# Patient Record
Sex: MALE | Race: WHITE | NOT HISPANIC OR LATINO | Employment: OTHER | URBAN - METROPOLITAN AREA
[De-identification: names, ages, dates, MRNs, and addresses within clinical notes are randomized per-mention and may not be internally consistent; named-entity substitution may affect disease eponyms.]

---

## 2019-03-27 ENCOUNTER — HISTORICAL (OUTPATIENT)
Dept: LAB | Facility: HOSPITAL | Age: 79
End: 2019-03-27

## 2019-03-27 LAB
ABS NEUT (OLG): 5.18 X10(3)/MCL (ref 2.1–9.2)
ALBUMIN SERPL-MCNC: 3.8 GM/DL (ref 3.4–5)
ALBUMIN/GLOB SERPL: 1.4 RATIO (ref 1.1–2)
ALP SERPL-CCNC: 58 UNIT/L (ref 50–136)
ALT SERPL-CCNC: 22 UNIT/L (ref 12–78)
AST SERPL-CCNC: 14 UNIT/L (ref 15–37)
BASOPHILS # BLD AUTO: 0 X10(3)/MCL (ref 0–0.2)
BASOPHILS NFR BLD AUTO: 0 %
BILIRUB SERPL-MCNC: 0.6 MG/DL (ref 0.2–1)
BILIRUBIN DIRECT+TOT PNL SERPL-MCNC: 0.1 MG/DL (ref 0–0.5)
BILIRUBIN DIRECT+TOT PNL SERPL-MCNC: 0.5 MG/DL (ref 0–0.8)
BUN SERPL-MCNC: 22 MG/DL (ref 7–18)
CALCIUM SERPL-MCNC: 9.1 MG/DL (ref 8.5–10.1)
CHLORIDE SERPL-SCNC: 106 MMOL/L (ref 98–107)
CHOLEST SERPL-MCNC: 215 MG/DL (ref 0–200)
CHOLEST/HDLC SERPL: 4.8 {RATIO} (ref 0–5)
CO2 SERPL-SCNC: 29 MMOL/L (ref 21–32)
CREAT SERPL-MCNC: 0.86 MG/DL (ref 0.7–1.3)
EOSINOPHIL # BLD AUTO: 0.2 X10(3)/MCL (ref 0–0.9)
EOSINOPHIL NFR BLD AUTO: 3 %
ERYTHROCYTE [DISTWIDTH] IN BLOOD BY AUTOMATED COUNT: 12.5 % (ref 11.5–17)
GLOBULIN SER-MCNC: 2.7 GM/DL (ref 2.4–3.5)
GLUCOSE SERPL-MCNC: 100 MG/DL (ref 74–106)
HCT VFR BLD AUTO: 46.4 % (ref 42–52)
HDLC SERPL-MCNC: 45 MG/DL (ref 35–60)
HGB BLD-MCNC: 14.9 GM/DL (ref 14–18)
LDLC SERPL CALC-MCNC: 145 MG/DL (ref 0–129)
LYMPHOCYTES # BLD AUTO: 1.4 X10(3)/MCL (ref 0.6–4.6)
LYMPHOCYTES NFR BLD AUTO: 18 %
MCH RBC QN AUTO: 29.5 PG (ref 27–31)
MCHC RBC AUTO-ENTMCNC: 32.1 GM/DL (ref 33–36)
MCV RBC AUTO: 91.9 FL (ref 80–94)
MONOCYTES # BLD AUTO: 0.6 X10(3)/MCL (ref 0.1–1.3)
MONOCYTES NFR BLD AUTO: 8 %
NEUTROPHILS # BLD AUTO: 5.18 X10(3)/MCL (ref 2.1–9.2)
NEUTROPHILS NFR BLD AUTO: 70 %
PLATELET # BLD AUTO: 249 X10(3)/MCL (ref 130–400)
PMV BLD AUTO: 10.6 FL (ref 9.4–12.4)
POTASSIUM SERPL-SCNC: 4 MMOL/L (ref 3.5–5.1)
PROT SERPL-MCNC: 6.5 GM/DL (ref 6.4–8.2)
PSA SERPL-MCNC: 3.37 NG/ML (ref 0–4)
RBC # BLD AUTO: 5.05 X10(6)/MCL (ref 4.7–6.1)
SODIUM SERPL-SCNC: 141 MMOL/L (ref 136–145)
T3FREE SERPL-MCNC: 2.89 PG/ML (ref 2.18–3.98)
T4 FREE SERPL-MCNC: 1.07 NG/DL (ref 0.76–1.46)
TRIGL SERPL-MCNC: 127 MG/DL (ref 30–150)
TSH SERPL-ACNC: 2.04 MIU/L (ref 0.36–3.74)
VLDLC SERPL CALC-MCNC: 25 MG/DL
WBC # SPEC AUTO: 7.4 X10(3)/MCL (ref 4.5–11.5)

## 2019-09-12 ENCOUNTER — HISTORICAL (OUTPATIENT)
Dept: RADIOLOGY | Facility: HOSPITAL | Age: 79
End: 2019-09-12

## 2020-08-12 ENCOUNTER — HISTORICAL (OUTPATIENT)
Dept: LAB | Facility: HOSPITAL | Age: 80
End: 2020-08-12

## 2020-08-12 LAB
ABS NEUT (OLG): 4.69 X10(3)/MCL (ref 2.1–9.2)
ALBUMIN SERPL-MCNC: 3.7 GM/DL (ref 3.4–4.8)
ALBUMIN/GLOB SERPL: 1.3 RATIO (ref 1.1–2)
ALP SERPL-CCNC: 54 UNIT/L (ref 40–150)
ALT SERPL-CCNC: 23 UNIT/L (ref 0–55)
AST SERPL-CCNC: 22 UNIT/L (ref 5–34)
BASOPHILS # BLD AUTO: 0.06 X10(3)/MCL (ref 0–0.2)
BASOPHILS NFR BLD AUTO: 0.9 % (ref 0–0.9)
BILIRUB SERPL-MCNC: 0.4 MG/DL (ref 0.2–1.2)
BILIRUBIN DIRECT+TOT PNL SERPL-MCNC: 0.2 MG/DL (ref 0–0.5)
BILIRUBIN DIRECT+TOT PNL SERPL-MCNC: 0.2 MG/DL (ref 0–0.8)
BUN SERPL-MCNC: 27 MG/DL (ref 8.4–25.7)
CALCIUM SERPL-MCNC: 9.6 MG/DL (ref 8.8–10)
CHLORIDE SERPL-SCNC: 100 MMOL/L (ref 98–107)
CHOLEST SERPL-MCNC: 197 MG/DL
CHOLEST/HDLC SERPL: 5 {RATIO} (ref 0–5)
CO2 SERPL-SCNC: 26 MMOL/L (ref 23–31)
CREAT SERPL-MCNC: 1.02 MG/DL (ref 0.72–1.25)
EOSINOPHIL # BLD AUTO: 0.29 X10(3)/MCL (ref 0–0.9)
EOSINOPHIL NFR BLD AUTO: 4.3 % (ref 0–6.5)
ERYTHROCYTE [DISTWIDTH] IN BLOOD BY AUTOMATED COUNT: 12 % (ref 11.5–17)
GLOBULIN SER-MCNC: 2.9 GM/DL (ref 2.4–3.5)
GLUCOSE SERPL-MCNC: 106 MG/DL (ref 82–115)
HCT VFR BLD AUTO: 38.7 % (ref 42–52)
HDLC SERPL-MCNC: 39 MG/DL (ref 40–60)
HGB BLD-MCNC: 13.7 GM/DL (ref 14–18)
IMM GRANULOCYTES # BLD AUTO: 0.02 10*3/UL (ref 0–0.02)
IMM GRANULOCYTES NFR BLD AUTO: 0.3 % (ref 0–0.43)
LDLC SERPL CALC-MCNC: 140 MG/DL (ref 50–140)
LYMPHOCYTES # BLD AUTO: 1.12 X10(3)/MCL (ref 0.6–4.6)
LYMPHOCYTES NFR BLD AUTO: 16.6 % (ref 16.2–38.3)
MCH RBC QN AUTO: 30.6 PG (ref 27–31)
MCHC RBC AUTO-ENTMCNC: 35.4 GM/DL (ref 33–36)
MCV RBC AUTO: 86.6 FL (ref 80–94)
MONOCYTES # BLD AUTO: 0.58 X10(3)/MCL (ref 0.1–1.3)
MONOCYTES NFR BLD AUTO: 8.6 % (ref 4.7–11.3)
NEUTROPHILS # BLD AUTO: 4.69 X10(3)/MCL (ref 2.1–9.2)
NEUTROPHILS NFR BLD AUTO: 69.3 % (ref 49.1–73.4)
NRBC BLD AUTO-RTO: 0 % (ref 0–0.2)
PLATELET # BLD AUTO: 242 X10(3)/MCL (ref 130–400)
PMV BLD AUTO: 9.3 FL (ref 7.4–10.4)
POTASSIUM SERPL-SCNC: 4.1 MMOL/L (ref 3.5–5.1)
PROT SERPL-MCNC: 6.6 GM/DL (ref 5.8–7.6)
PSA SERPL-MCNC: 2.53 NG/ML
RBC # BLD AUTO: 4.47 X10(6)/MCL (ref 4.7–6.1)
SODIUM SERPL-SCNC: 135 MMOL/L (ref 136–145)
TRIGL SERPL-MCNC: 90 MG/DL (ref 0–150)
TSH SERPL-ACNC: 2.75 UIU/ML (ref 0.35–4.94)
VLDLC SERPL CALC-MCNC: 18 MG/DL
WBC # SPEC AUTO: 6.8 X10(3)/MCL (ref 4.5–11.5)

## 2021-08-09 ENCOUNTER — HISTORICAL (OUTPATIENT)
Dept: LAB | Facility: HOSPITAL | Age: 81
End: 2021-08-09

## 2021-08-09 LAB
ABS NEUT (OLG): 4.82 X10(3)/MCL (ref 2.1–9.2)
ALBUMIN SERPL-MCNC: 3.8 GM/DL (ref 3.4–4.8)
ALBUMIN/GLOB SERPL: 1.1 RATIO (ref 1.1–2)
ALP SERPL-CCNC: 59 UNIT/L (ref 40–150)
ALT SERPL-CCNC: 15 UNIT/L (ref 0–55)
APPEARANCE, UA: CLEAR
AST SERPL-CCNC: 16 UNIT/L (ref 5–34)
BACTERIA SPEC CULT: NORMAL
BASOPHILS # BLD AUTO: 0.03 X10(3)/MCL (ref 0–0.2)
BASOPHILS NFR BLD AUTO: 0.4 % (ref 0–0.9)
BILIRUB SERPL-MCNC: 0.6 MG/DL (ref 0.2–1.2)
BILIRUB UR QL STRIP: NEGATIVE
BILIRUBIN DIRECT+TOT PNL SERPL-MCNC: 0.3 MG/DL (ref 0–0.5)
BILIRUBIN DIRECT+TOT PNL SERPL-MCNC: 0.3 MG/DL (ref 0–0.8)
BUN SERPL-MCNC: 29.6 MG/DL (ref 8.4–25.7)
CALCIUM SERPL-MCNC: 9.6 MG/DL (ref 8.8–10)
CHLORIDE SERPL-SCNC: 99 MMOL/L (ref 98–107)
CHOLEST SERPL-MCNC: 157 MG/DL
CHOLEST/HDLC SERPL: 4 {RATIO} (ref 0–5)
CO2 SERPL-SCNC: 25 MMOL/L (ref 23–31)
COLOR UR: YELLOW
CREAT SERPL-MCNC: 1.13 MG/DL (ref 0.72–1.25)
EOSINOPHIL # BLD AUTO: 0.25 X10(3)/MCL (ref 0–0.9)
EOSINOPHIL NFR BLD AUTO: 3.7 % (ref 0–6.5)
ERYTHROCYTE [DISTWIDTH] IN BLOOD BY AUTOMATED COUNT: 12 % (ref 11.5–17)
EST. AVERAGE GLUCOSE BLD GHB EST-MCNC: 102.5 MG/DL
GLOBULIN SER-MCNC: 3.4 GM/DL (ref 2.4–3.5)
GLUCOSE (UA): NEGATIVE
GLUCOSE SERPL-MCNC: 107 MG/DL (ref 82–115)
HBA1C MFR BLD: 5.2 %
HCT VFR BLD AUTO: 38.1 % (ref 42–52)
HDLC SERPL-MCNC: 43 MG/DL (ref 40–60)
HGB BLD-MCNC: 13 GM/DL (ref 14–18)
HGB UR QL STRIP: ABNORMAL
IMM GRANULOCYTES # BLD AUTO: 0.02 10*3/UL (ref 0–0.02)
IMM GRANULOCYTES NFR BLD AUTO: 0.3 % (ref 0–0.43)
KETONES UR QL STRIP: NEGATIVE
LDLC SERPL CALC-MCNC: 103 MG/DL (ref 50–140)
LEUKOCYTE ESTERASE UR QL STRIP: NEGATIVE
LYMPHOCYTES # BLD AUTO: 1.13 X10(3)/MCL (ref 0.6–4.6)
LYMPHOCYTES NFR BLD AUTO: 16.8 % (ref 16.2–38.3)
MCH RBC QN AUTO: 29.4 PG (ref 27–31)
MCHC RBC AUTO-ENTMCNC: 34.1 GM/DL (ref 33–36)
MCV RBC AUTO: 86.2 FL (ref 80–94)
MONOCYTES # BLD AUTO: 0.48 X10(3)/MCL (ref 0.1–1.3)
MONOCYTES NFR BLD AUTO: 7.1 % (ref 4.7–11.3)
NEUTROPHILS # BLD AUTO: 4.82 X10(3)/MCL (ref 2.1–9.2)
NEUTROPHILS NFR BLD AUTO: 71.7 % (ref 49.1–73.4)
NITRITE UR QL STRIP: NEGATIVE
NRBC BLD AUTO-RTO: 0 % (ref 0–0.2)
PH UR STRIP: 5.5 [PH] (ref 5–7)
PLATELET # BLD AUTO: 402 X10(3)/MCL (ref 130–400)
PMV BLD AUTO: 8.5 FL (ref 7.4–10.4)
POTASSIUM SERPL-SCNC: 4 MMOL/L (ref 3.5–5.1)
PROT SERPL-MCNC: 7.2 GM/DL (ref 5.8–7.6)
PROT UR QL STRIP: NEGATIVE
PSA SERPL-MCNC: 5.08 NG/ML
RBC # BLD AUTO: 4.42 X10(6)/MCL (ref 4.7–6.1)
RBC #/AREA URNS HPF: 0 /[HPF]
SODIUM SERPL-SCNC: 133 MMOL/L (ref 136–145)
SP GR UR STRIP: 1.02 (ref 1–1.03)
SQUAMOUS EPITHELIAL, UA: NORMAL /LPF
TRIGL SERPL-MCNC: 57 MG/DL (ref 0–150)
TSH SERPL-ACNC: 2.8 UIU/ML (ref 0.35–4.94)
UROBILINOGEN UR STRIP-ACNC: NEGATIVE
VLDLC SERPL CALC-MCNC: 11 MG/DL
WBC # SPEC AUTO: 6.7 X10(3)/MCL (ref 4.5–11.5)
WBC #/AREA URNS HPF: 0 /[HPF]

## 2021-08-10 ENCOUNTER — HISTORICAL (OUTPATIENT)
Dept: LAB | Facility: HOSPITAL | Age: 81
End: 2021-08-10

## 2021-08-10 LAB
FERRITIN SERPL-MCNC: 370.28 NG/ML (ref 21.81–274.66)
FOLATE SERPL-MCNC: 19.1 NG/ML (ref 7–31.4)
IRON SATN MFR SERPL: 29 % (ref 20–50)
IRON SERPL-MCNC: 76 UG/DL (ref 65–175)
TIBC SERPL-MCNC: 184 UG/DL (ref 69–240)
TIBC SERPL-MCNC: 260 UG/DL (ref 250–450)
TRANSFERRIN SERPL-MCNC: 239 MG/DL
VIT B12 SERPL-MCNC: 605 PG/ML (ref 213–816)

## 2022-04-10 ENCOUNTER — HISTORICAL (OUTPATIENT)
Dept: ADMINISTRATIVE | Facility: HOSPITAL | Age: 82
End: 2022-04-10

## 2022-04-29 VITALS
WEIGHT: 202.38 LBS | DIASTOLIC BLOOD PRESSURE: 73 MMHG | BODY MASS INDEX: 28.97 KG/M2 | OXYGEN SATURATION: 96 % | HEIGHT: 70 IN | SYSTOLIC BLOOD PRESSURE: 132 MMHG

## 2022-08-12 ENCOUNTER — TELEPHONE (OUTPATIENT)
Dept: FAMILY MEDICINE | Facility: CLINIC | Age: 82
End: 2022-08-12
Payer: MEDICARE

## 2022-08-12 NOTE — TELEPHONE ENCOUNTER
----- Message from Giovana Elsy sent at 8/12/2022 11:46 AM CDT -----  Regarding: med refill  Type:  Needs Medical Advice    Who Called: pt   Symptoms (please be specific):    How long has patient had these symptoms:    Pharmacy name and phone #:    Would the patient rather a call back or a response via [facility]? Call back   Best Call Back Number: [phone]  Additional Information: pt is out of medication and would like a refill. Pls f/u with patient because there are no meds listed in his chart and he was very difficult to understand on the phone.

## 2022-08-15 ENCOUNTER — TELEPHONE (OUTPATIENT)
Dept: FAMILY MEDICINE | Facility: CLINIC | Age: 82
End: 2022-08-15
Payer: MEDICARE

## 2022-08-15 DIAGNOSIS — I10 HYPERTENSION, UNSPECIFIED TYPE: Primary | ICD-10-CM

## 2022-08-15 RX ORDER — HYDROCHLOROTHIAZIDE 25 MG/1
1 TABLET ORAL DAILY
COMMUNITY
Start: 2021-08-16 | End: 2022-08-15 | Stop reason: SDUPTHER

## 2022-08-15 RX ORDER — HYDROCHLOROTHIAZIDE 25 MG/1
25 TABLET ORAL DAILY
Qty: 90 TABLET | Refills: 0 | Status: SHIPPED | OUTPATIENT
Start: 2022-08-15 | End: 2022-11-04

## 2022-08-15 NOTE — TELEPHONE ENCOUNTER
LOV:  NOV: 8.30.22  I spoke with patient and he needs hctz rx sent to pharmacy. Rx sent and patient aware. Maria Del Carmen

## 2022-08-31 ENCOUNTER — LAB VISIT (OUTPATIENT)
Dept: LAB | Facility: HOSPITAL | Age: 82
End: 2022-08-31
Attending: NURSE PRACTITIONER
Payer: MEDICARE

## 2022-08-31 ENCOUNTER — OFFICE VISIT (OUTPATIENT)
Dept: FAMILY MEDICINE | Facility: CLINIC | Age: 82
End: 2022-08-31
Payer: MEDICARE

## 2022-08-31 VITALS
TEMPERATURE: 99 F | OXYGEN SATURATION: 96 % | SYSTOLIC BLOOD PRESSURE: 125 MMHG | WEIGHT: 200.38 LBS | HEIGHT: 69 IN | HEART RATE: 69 BPM | BODY MASS INDEX: 29.68 KG/M2 | DIASTOLIC BLOOD PRESSURE: 68 MMHG | RESPIRATION RATE: 16 BRPM

## 2022-08-31 DIAGNOSIS — G89.29 CHRONIC PAIN OF BOTH KNEES: ICD-10-CM

## 2022-08-31 DIAGNOSIS — E03.9 HYPOTHYROIDISM, UNSPECIFIED TYPE: ICD-10-CM

## 2022-08-31 DIAGNOSIS — E78.5 HYPERLIPIDEMIA, UNSPECIFIED HYPERLIPIDEMIA TYPE: ICD-10-CM

## 2022-08-31 DIAGNOSIS — I10 PRIMARY HYPERTENSION: ICD-10-CM

## 2022-08-31 DIAGNOSIS — M25.561 CHRONIC PAIN OF BOTH KNEES: ICD-10-CM

## 2022-08-31 DIAGNOSIS — M25.551 RIGHT HIP PAIN: ICD-10-CM

## 2022-08-31 DIAGNOSIS — Z87.898 HISTORY OF ELEVATED PSA: ICD-10-CM

## 2022-08-31 DIAGNOSIS — Z00.00 WELLNESS EXAMINATION: Primary | ICD-10-CM

## 2022-08-31 DIAGNOSIS — Z12.5 ENCOUNTER FOR PROSTATE CANCER SCREENING: ICD-10-CM

## 2022-08-31 DIAGNOSIS — M25.562 CHRONIC PAIN OF BOTH KNEES: ICD-10-CM

## 2022-08-31 DIAGNOSIS — Z23 NEED FOR VACCINATION AGAINST STREPTOCOCCUS PNEUMONIAE: ICD-10-CM

## 2022-08-31 DIAGNOSIS — Z74.09 MOBILITY IMPAIRED: ICD-10-CM

## 2022-08-31 DIAGNOSIS — R01.1 MURMUR, CARDIAC: ICD-10-CM

## 2022-08-31 DIAGNOSIS — Z00.00 WELLNESS EXAMINATION: ICD-10-CM

## 2022-08-31 DIAGNOSIS — G47.33 OSA ON CPAP: ICD-10-CM

## 2022-08-31 LAB
ALBUMIN SERPL-MCNC: 4.1 GM/DL (ref 3.4–4.8)
ALBUMIN/GLOB SERPL: 1.5 RATIO (ref 1.1–2)
ALP SERPL-CCNC: 51 UNIT/L (ref 40–150)
ALT SERPL-CCNC: 13 UNIT/L (ref 0–55)
AST SERPL-CCNC: 16 UNIT/L (ref 5–34)
BASOPHILS # BLD AUTO: 0.05 X10(3)/MCL (ref 0–0.2)
BASOPHILS NFR BLD AUTO: 0.8 %
BILIRUBIN DIRECT+TOT PNL SERPL-MCNC: 0.6 MG/DL
BUN SERPL-MCNC: 30.6 MG/DL (ref 8.4–25.7)
CALCIUM SERPL-MCNC: 10 MG/DL (ref 8.8–10)
CHLORIDE SERPL-SCNC: 102 MMOL/L (ref 98–107)
CHOLEST SERPL-MCNC: 174 MG/DL
CHOLEST/HDLC SERPL: 4 {RATIO} (ref 0–5)
CO2 SERPL-SCNC: 27 MMOL/L (ref 23–31)
CREAT SERPL-MCNC: 1.03 MG/DL (ref 0.73–1.18)
EOSINOPHIL # BLD AUTO: 0.19 X10(3)/MCL (ref 0–0.9)
EOSINOPHIL NFR BLD AUTO: 3.1 %
ERYTHROCYTE [DISTWIDTH] IN BLOOD BY AUTOMATED COUNT: 11.9 % (ref 11.5–17)
GFR SERPLBLD CREATININE-BSD FMLA CKD-EPI: >60 MLS/MIN/1.73/M2
GLOBULIN SER-MCNC: 2.8 GM/DL (ref 2.4–3.5)
GLUCOSE SERPL-MCNC: 99 MG/DL (ref 82–115)
HCT VFR BLD AUTO: 37.2 % (ref 42–52)
HDLC SERPL-MCNC: 39 MG/DL (ref 35–60)
HGB BLD-MCNC: 13 GM/DL (ref 14–18)
IMM GRANULOCYTES # BLD AUTO: 0.01 X10(3)/MCL (ref 0–0.04)
IMM GRANULOCYTES NFR BLD AUTO: 0.2 %
LDLC SERPL CALC-MCNC: 118 MG/DL (ref 50–140)
LYMPHOCYTES # BLD AUTO: 1.07 X10(3)/MCL (ref 0.6–4.6)
LYMPHOCYTES NFR BLD AUTO: 17.7 %
MCH RBC QN AUTO: 30.8 PG (ref 27–31)
MCHC RBC AUTO-ENTMCNC: 34.9 MG/DL (ref 33–36)
MCV RBC AUTO: 88.2 FL (ref 80–94)
MONOCYTES # BLD AUTO: 0.47 X10(3)/MCL (ref 0.1–1.3)
MONOCYTES NFR BLD AUTO: 7.8 %
NEUTROPHILS # BLD AUTO: 4.3 X10(3)/MCL (ref 2.1–9.2)
NEUTROPHILS NFR BLD AUTO: 70.4 %
NRBC BLD AUTO-RTO: 0 %
PLATELET # BLD AUTO: 232 X10(3)/MCL (ref 130–400)
PMV BLD AUTO: 9 FL (ref 7.4–10.4)
POTASSIUM SERPL-SCNC: 3.9 MMOL/L (ref 3.5–5.1)
PROT SERPL-MCNC: 6.9 GM/DL (ref 5.8–7.6)
PSA SERPL-MCNC: 4.49 NG/ML
RBC # BLD AUTO: 4.22 X10(6)/MCL (ref 4.7–6.1)
SODIUM SERPL-SCNC: 141 MMOL/L (ref 136–145)
TRIGL SERPL-MCNC: 86 MG/DL (ref 34–140)
TSH SERPL-ACNC: 2.44 UIU/ML (ref 0.35–4.94)
VLDLC SERPL CALC-MCNC: 17 MG/DL
WBC # SPEC AUTO: 6 X10(3)/MCL (ref 4.5–11.5)

## 2022-08-31 PROCEDURE — 80053 COMPREHEN METABOLIC PANEL: CPT

## 2022-08-31 PROCEDURE — G0438 PR WELCOME MEDICARE ANNUAL WELLNESS INITIAL VISIT: ICD-10-PCS | Mod: ,,, | Performed by: NURSE PRACTITIONER

## 2022-08-31 PROCEDURE — G0009 PNEUMOCOCCAL CONJUGATE VACCINE 13-VALENT LESS THAN 5YO & GREATER THAN: ICD-10-PCS | Mod: ,,, | Performed by: NURSE PRACTITIONER

## 2022-08-31 PROCEDURE — 90670 PCV13 VACCINE IM: CPT | Mod: ,,, | Performed by: NURSE PRACTITIONER

## 2022-08-31 PROCEDURE — G0009 ADMIN PNEUMOCOCCAL VACCINE: HCPCS | Mod: ,,, | Performed by: NURSE PRACTITIONER

## 2022-08-31 PROCEDURE — 36415 COLL VENOUS BLD VENIPUNCTURE: CPT

## 2022-08-31 PROCEDURE — 84153 ASSAY OF PSA TOTAL: CPT

## 2022-08-31 PROCEDURE — 84443 ASSAY THYROID STIM HORMONE: CPT

## 2022-08-31 PROCEDURE — 85025 COMPLETE CBC W/AUTO DIFF WBC: CPT

## 2022-08-31 PROCEDURE — 90670 PNEUMOCOCCAL CONJUGATE VACCINE 13-VALENT LESS THAN 5YO & GREATER THAN: ICD-10-PCS | Mod: ,,, | Performed by: NURSE PRACTITIONER

## 2022-08-31 PROCEDURE — 80061 LIPID PANEL: CPT

## 2022-08-31 PROCEDURE — G0438 PPPS, INITIAL VISIT: HCPCS | Mod: ,,, | Performed by: NURSE PRACTITIONER

## 2022-08-31 RX ORDER — AMLODIPINE BESYLATE 5 MG/1
5 TABLET ORAL DAILY
COMMUNITY
Start: 2022-07-26 | End: 2022-12-15 | Stop reason: SDUPTHER

## 2022-08-31 RX ORDER — LISINOPRIL 20 MG/1
20 TABLET ORAL 2 TIMES DAILY
COMMUNITY
Start: 2022-06-11 | End: 2023-01-30 | Stop reason: SDUPTHER

## 2022-08-31 RX ORDER — AMOXICILLIN 500 MG/1
500 CAPSULE ORAL EVERY 6 HOURS
COMMUNITY
Start: 2022-03-28 | End: 2023-01-03

## 2022-08-31 RX ORDER — FENOFIBRATE 48 MG/1
48 TABLET, FILM COATED ORAL DAILY
COMMUNITY
Start: 2022-08-21 | End: 2022-11-28 | Stop reason: SDUPTHER

## 2022-08-31 RX ORDER — TAMSULOSIN HYDROCHLORIDE 0.4 MG/1
0.4 CAPSULE ORAL DAILY
COMMUNITY
Start: 2022-07-26

## 2022-08-31 RX ORDER — LEVOTHYROXINE SODIUM 50 UG/1
50 TABLET ORAL DAILY
COMMUNITY
Start: 2022-07-26 | End: 2022-12-15 | Stop reason: SDUPTHER

## 2022-08-31 NOTE — PROGRESS NOTES
Subjective:      Patient ID: Eloy Patel is a 82 y.o. White male      Chief Complaint: Medicare AWV (wellness)       Past Medical History:   Diagnosis Date    Anemia, unspecified     Elevated PSA     HLD (hyperlipidemia)     HTN (hypertension)     Hypothyroidism     Mobility impaired     Obesity     WOLF on CPAP         HPI  Presents to clinic for Annual Wellness.  Speicalists are Dr. Canseco, Dermatology    Labs due and ordered  Colonoscopy; 2019 with Dr. Rivera; Normal; No longer needed  Pneumovax 23 up to date  Prevnar 13?  Shingles vaccine up to date (2022)  Covid-19 vaccine and booster up to date  Influenza up to date        Review of Systems       Objective:      Vitals:    08/31/22 0900   BP: 125/68   Pulse: 69   Resp: 16   Temp: 98.5 °F (36.9 °C)      Body mass index is 29.59 kg/m².     Physical Exam  Vitals reviewed.   Constitutional:       Appearance: He is not toxic-appearing.   HENT:      Head: Normocephalic.      Mouth/Throat:      Mouth: Mucous membranes are moist.   Eyes:      Extraocular Movements: Extraocular movements intact.      Pupils: Pupils are equal, round, and reactive to light.   Cardiovascular:      Rate and Rhythm: Normal rate and regular rhythm.      Pulses: Normal pulses.      Heart sounds: Murmur heard.   Pulmonary:      Effort: Pulmonary effort is normal. No respiratory distress.      Breath sounds: Normal breath sounds.   Abdominal:      General: Bowel sounds are normal. There is no distension.      Palpations: Abdomen is soft.      Tenderness: There is no abdominal tenderness.   Musculoskeletal:         General: No tenderness. Normal range of motion.      Cervical back: Neck supple.   Skin:     General: Skin is warm and dry.   Neurological:      Mental Status: He is alert and oriented to person, place, and time.   Psychiatric:         Mood and Affect: Mood normal.          Current Outpatient Medications:     amLODIPine (NORVASC) 5 MG tablet, Take 5 mg by mouth once daily.,  Disp: , Rfl:     amoxicillin (AMOXIL) 500 MG capsule, Take 500 mg by mouth every 6 (six) hours., Disp: , Rfl:     fenofibrate (TRICOR) 48 MG tablet, Take 48 mg by mouth once daily., Disp: , Rfl:     hydroCHLOROthiazide (HYDRODIURIL) 25 MG tablet, Take 1 tablet (25 mg total) by mouth once daily., Disp: 90 tablet, Rfl: 0    levothyroxine (SYNTHROID) 50 MCG tablet, Take 50 mcg by mouth once daily., Disp: , Rfl:     lisinopriL (PRINIVIL,ZESTRIL) 20 MG tablet, Take 20 mg by mouth 2 (two) times daily., Disp: , Rfl:     tamsulosin (FLOMAX) 0.4 mg Cap, Take 0.4 mg by mouth once daily at 6am., Disp: , Rfl:     miscellaneous medical supply Kit, 1 each by Misc.(Non-Drug; Combo Route) route once daily. Rollator Use Daily Dx: Mobility Impaired; Dx Z74.09, Disp: 1 kit, Rfl: 0    Assessment & Plan:     Problem List Items Addressed This Visit          Cardiac/Vascular    Primary hypertension     BP at goal  Continue current medication         Relevant Orders    CBC Auto Differential    Comprehensive Metabolic Panel    Lipid Panel    TSH    Urinalysis, Reflex to Urine Culture Urine, Clean Catch    Hyperlipidemia     Repeat labs today  Currently taking Tricor 48 mg po daily           Relevant Orders    CBC Auto Differential    Comprehensive Metabolic Panel    Lipid Panel    TSH    Urinalysis, Reflex to Urine Culture Urine, Clean Catch    Murmur, cardiac     States never known of murmur  Desires Cardiology referral; Referral placed         Relevant Orders    Ambulatory referral/consult to Cardiology       Renal/    History of elevated PSA     Repeat labs today         Relevant Orders    PSA, Screening    Encounter for prostate cancer screening    Relevant Orders    PSA, Screening       ID    Need for vaccination against Streptococcus pneumoniae     Prevnar 13 today         Relevant Orders    (In Office Administered) Pneumococcal Conjugate Vaccine (13 Valent) (IM)       Endocrine    Hypothyroidism     Euthyroid  Repeat labs  today         Relevant Orders    CBC Auto Differential    Comprehensive Metabolic Panel    Lipid Panel    TSH    Urinalysis, Reflex to Urine Culture Urine, Clean Catch       Orthopedic    Right hip pain     OTC analgesics  Referral placed to Ortho         Relevant Orders    Ambulatory referral/consult to Orthopedics    CBC Auto Differential    Comprehensive Metabolic Panel    Lipid Panel    TSH    Urinalysis, Reflex to Urine Culture Urine, Clean Catch    Chronic pain of both knees     OTC analgesics  Referral placed to Ortho           Relevant Orders    Ambulatory referral/consult to Orthopedics    CBC Auto Differential    Comprehensive Metabolic Panel    Lipid Panel    TSH    Urinalysis, Reflex to Urine Culture Urine, Clean Catch       Other    Wellness examination - Primary     Labs due and ordered  Colonoscopy; 2019 with Dr. Rivera; Normal; No longer needed  Pneumovax 23 up to date  Prevnar 13 today  Shingles vaccine up to date (2022)  Covid-19 vaccine and booster up to date  Influenza up to date         Relevant Orders    CBC Auto Differential    Comprehensive Metabolic Panel    Lipid Panel    TSH    Urinalysis, Reflex to Urine Culture Urine, Clean Catch    PSA, Screening    WOLF on CPAP     Stable  Continue C-PAP         Mobility impaired     Hx left foot deformity  Rx for Rollator         Relevant Medications    miscellaneous medical supply Kit    Other Relevant Orders    CBC Auto Differential    Comprehensive Metabolic Panel    Lipid Panel    TSH    Urinalysis, Reflex to Urine Culture Urine, Clean Catch            ***

## 2022-08-31 NOTE — ASSESSMENT & PLAN NOTE
Labs due and ordered  Colonoscopy; 2019 with Dr. Rivera; Normal; No longer needed  Pneumovax 23 up to date  Prevnar 13 today  Shingles vaccine up to date (2022)  Covid-19 vaccine and booster up to date  Influenza up to date

## 2022-08-31 NOTE — PROGRESS NOTES
Patient ID: [id number]     Chief Complaint: Medicare AWV (wellness)      HPI:     Eloy Patel is a 82 y.o. male here today for a Medicare Wellness.       Opioid Screening: Patient medication list reviewed, patient is not taking prescription opioids. Patient is not using additional opioids than prescribed. Patient is at low risk of substance abuse based on this opioid use history.       ----------------------------  Anemia, unspecified  Elevated PSA  HLD (hyperlipidemia)  HTN (hypertension)  Hypothyroidism  Mobility impaired  Obesity  WOLF on CPAP     Past Surgical History:   Procedure Laterality Date    CHOLECYSTECTOMY  2003    COLONOSCOPY  06/24/2019    COLONOSCOPY  2008    excision right interior neck  02/07/2019    HERNIA REPAIR         Review of patient's allergies indicates:   Allergen Reactions    Sulfamethoxazole-trimethoprim      Other reaction(s): vomiting    Sulfa (sulfonamide antibiotics) Rash and Itching       Outpatient Medications Marked as Taking for the 8/31/22 encounter (Office Visit) with KERA Hernandez   Medication Sig Dispense Refill    amLODIPine (NORVASC) 5 MG tablet Take 5 mg by mouth once daily.      amoxicillin (AMOXIL) 500 MG capsule Take 500 mg by mouth every 6 (six) hours.      fenofibrate (TRICOR) 48 MG tablet Take 48 mg by mouth once daily.      hydroCHLOROthiazide (HYDRODIURIL) 25 MG tablet Take 1 tablet (25 mg total) by mouth once daily. 90 tablet 0    levothyroxine (SYNTHROID) 50 MCG tablet Take 50 mcg by mouth once daily.      lisinopriL (PRINIVIL,ZESTRIL) 20 MG tablet Take 20 mg by mouth 2 (two) times daily.      tamsulosin (FLOMAX) 0.4 mg Cap Take 0.4 mg by mouth once daily at 6am.      [DISCONTINUED] medical supply, miscellaneous (MISCELLANEOUS MEDICAL SUPPLY MISC) 1 each by Misc.(Non-Drug; Combo Route) route once daily. Rollator  Use Daily  Dx: Mobility Impaire         Social History     Socioeconomic History    Marital status:    Occupational History     Occupation: retired   Tobacco Use    Smoking status: Former     Types: Cigarettes     Passive exposure: Never    Smokeless tobacco: Never   Substance and Sexual Activity    Alcohol use: Yes     Comment: occassionally    Drug use: Never        History reviewed. No pertinent family history.     Patient Care Team:  Dana Corrales MD as PCP - General (Family Medicine)  Dana Corrales MD       Subjective:     Review of Systems   Constitutional: Negative.    HENT: Negative.     Eyes: Negative.    Respiratory: Negative.     Cardiovascular: Negative.    Gastrointestinal: Negative.    Genitourinary: Negative.    Musculoskeletal:  Positive for joint pain (right hip and bilateral knee pain).   Skin: Negative.    Neurological: Negative.    Endo/Heme/Allergies: Negative.    Psychiatric/Behavioral: Negative.         Patient Reported Health Risk Assessment  What is your age?: 80 or older  Are you male or female?: Male  During the past four weeks, how much have you been bothered by emotional problems such as feeling anxious, depressed, irritable, sad, or downhearted and blue?: Slightly  During the past five weeks, has your physical and/or emotional health limited your social activities with family, friends, neighbors, or groups?: Slightly  During the past four weeks, how much bodily pain have you generally had?: Mild pain  During the past four weeks, was someone available to help if you needed and wanted help?: Yes, as much as I wanted  During the past four weeks, what was the hardest physical activity you could do for at least two minutes?: Light  Can you get to places out of walking distance without help?  (For example, can you travel alone on buses or taxis, or drive your own car?): Yes  Can you go shopping for groceries or clothes without someone's help?: Yes  Can you prepare your own meals?: Yes  Can you do your own housework without help?: Yes  Because of any health problems, do you need the help of another person  with your personal care needs such as eating, bathing, dressing, or getting around the house?: No  Can you handle your own money without help?: Yes  During the past four weeks, how would you rate your health in general?: Fair  How have things been going for you during the past four weeks?: Pretty well  Are you having difficulties driving your car?: Not applicable, I do not use a car  Do you always fasten your seat belt when you are in a car?: Yes, usually  How often in the past four weeks have you been bothered by falling or dizzy when standing up?: Never  How often in the past four weeks have you been bothered by sexual problems?: Never  How often in the past four weeks have you been bothered by trouble eating well?: Never  How often in the past four weeks have you been bothered by teeth or denture problems?: Never  How often in the past four weeks have you been bothered with problems using the telephone?: Never  How often in the past four weeks have you been bothered by tiredness or fatigue?: Often  Have you fallen two or more times in the past year?: No  Are you afraid of falling?: Yes  Are you a smoker?: No  During the past four weeks, how many drinks of wine, beer, or other alcoholic beverages did you have?: One drink or less per week  Do you exercise for about 20 minutes three or more days a week?: No, I usually do not exercise this much  Have you been given any information to help you with hazards in your house that might hurt you?: Yes  Have you been given any information to help you with keeping track of your medications?: Yes  How often do you have trouble taking medicines the way you've been told to take them?: I always take them as prescribed  How confident are you that you can control and manage most of your health problems?: Somewhat confident  What is your race? (Check all that apply.):     Objective:     /68 (BP Location: Right arm, Patient Position: Sitting, BP Method: Medium  "(Automatic))   Pulse 69   Temp 98.5 °F (36.9 °C) (Temporal)   Resp 16   Ht 5' 9" (1.753 m)   Wt 90.9 kg (200 lb 6.4 oz)   SpO2 96%   BMI 29.59 kg/m²     Physical Exam  Vitals reviewed.   Constitutional:       Appearance: He is not toxic-appearing.   HENT:      Head: Normocephalic.      Mouth/Throat:      Mouth: Mucous membranes are moist.   Eyes:      Extraocular Movements: Extraocular movements intact.      Pupils: Pupils are equal, round, and reactive to light.   Cardiovascular:      Rate and Rhythm: Normal rate and regular rhythm.      Pulses: Normal pulses.      Heart sounds: Murmur heard.   Pulmonary:      Effort: Pulmonary effort is normal. No respiratory distress.      Breath sounds: Normal breath sounds.   Abdominal:      General: Bowel sounds are normal. There is no distension.      Palpations: Abdomen is soft.      Tenderness: There is no abdominal tenderness.   Musculoskeletal:         General: No tenderness. Normal range of motion.      Cervical back: Neck supple.   Skin:     General: Skin is warm and dry.   Neurological:      Mental Status: He is alert and oriented to person, place, and time.   Psychiatric:         Mood and Affect: Mood normal.       No flowsheet data found.  Fall Risk Assessment - Outpatient 8/31/2022   Mobility Status Ambulatory w/ assistance   Number of falls 0   Identified as fall risk 0           Depression Screening  Over the past two weeks, has the patient felt down, depressed, or hopeless?: No  Over the past two weeks, has the patient felt little interest or pleasure in doing things?: Yes  Functional Ability/Safety Screening  Was the patient's timed Up & Go test unsteady or longer than 30 seconds?: Yes  Does the patient need help with phone, transportation, shopping, preparing meals, housework, laundry, meds, or managing money?: No  Does the patient's home have rugs in the hallway, lack grab bars in the bathroom, lack handrails on the stairs or have poor lighting?: " No  Have you noticed any hearing difficulties?: No  Cognitive Function (Assessed through direct observation with due consideration of information obtained by way of patient reports and/or concerns raised by family, friends, caretakers, or others)    Does the patient repeat questions/statements in the same day?: No  Does the patient have trouble remembering the date, year, and time?: No  Does the patient have difficulty managing finances?: No  Does the patient have a decreased sense of direction?: No  Assessment/Plan:     1. Wellness examination  Assessment & Plan:  Labs due and ordered  Colonoscopy; 2019 with Dr. Rivera; Normal; No longer needed  Pneumovax 23 up to date  Prevnar 13 today  Shingles vaccine up to date (2022)  Covid-19 vaccine and booster up to date  Influenza up to date    Orders:  -     CBC Auto Differential  -     Comprehensive Metabolic Panel  -     Lipid Panel  -     TSH  -     Urinalysis, Reflex to Urine Culture Urine, Clean Catch  -     PSA, Screening    2. Primary hypertension  Assessment & Plan:  BP at goal  Continue current medication    Orders:  -     CBC Auto Differential  -     Comprehensive Metabolic Panel  -     Lipid Panel  -     TSH  -     Urinalysis, Reflex to Urine Culture Urine, Clean Catch    3. Hyperlipidemia, unspecified hyperlipidemia type  Assessment & Plan:  Repeat labs today  Currently taking Tricor 48 mg po daily      Orders:  -     CBC Auto Differential  -     Comprehensive Metabolic Panel  -     Lipid Panel  -     TSH  -     Urinalysis, Reflex to Urine Culture Urine, Clean Catch    4. WOLF on CPAP  Assessment & Plan:  Stable  Continue C-PAP      5. Hypothyroidism, unspecified type  Assessment & Plan:  Euthyroid  Repeat labs today    Orders:  -     CBC Auto Differential  -     Comprehensive Metabolic Panel  -     Lipid Panel  -     TSH  -     Urinalysis, Reflex to Urine Culture Urine, Clean Catch    6. Right hip pain  Assessment & Plan:  OTC analgesics  Referral placed to  Ortho    Orders:  -     Ambulatory referral/consult to Orthopedics  -     CBC Auto Differential  -     Comprehensive Metabolic Panel  -     Lipid Panel  -     TSH  -     Urinalysis, Reflex to Urine Culture Urine, Clean Catch    7. Chronic pain of both knees  Assessment & Plan:  OTC analgesics  Referral placed to Ortho      Orders:  -     Ambulatory referral/consult to Orthopedics  -     CBC Auto Differential  -     Comprehensive Metabolic Panel  -     Lipid Panel  -     TSH  -     Urinalysis, Reflex to Urine Culture Urine, Clean Catch    8. Mobility impaired  Assessment & Plan:  Hx left foot deformity  Rx for Rollator    Orders:  -     miscellaneous medical supply Kit  -     CBC Auto Differential  -     Comprehensive Metabolic Panel  -     Lipid Panel  -     TSH  -     Urinalysis, Reflex to Urine Culture Urine, Clean Catch    9. Need for vaccination against Streptococcus pneumoniae  Assessment & Plan:  Prevnar 13 today    Orders:  -     (In Office Administered) Pneumococcal Conjugate Vaccine (13 Valent) (IM)    10. History of elevated PSA  Assessment & Plan:  Repeat labs today    Orders:  -     PSA, Screening    11. Encounter for prostate cancer screening  -     PSA, Screening    12. Murmur, cardiac  Assessment & Plan:  States never known of murmur  Desires Cardiology referral; Referral placed    Orders:  -     Ambulatory referral/consult to Cardiology         Medicare Annual Wellness and Personalized Prevention Plan:   Fall Risk + Home Safety + Hearing Impairment + Depression Screen + Opioid and Substance Abuse Screening + Cognitive Impairment Screen + Health Risk Assessment all reviewed.     Health Maintenance Topics with due status: Not Due       Topic Last Completion Date    Lipid Panel 08/09/2021    Influenza Vaccine 10/01/2021      The patient's Health Maintenance was reviewed and the following appears to be due at this time:   Health Maintenance Due   Topic Date Due    TETANUS VACCINE  Never done     Pneumococcal Vaccines (Age 65+) (2 - PCV) 03/26/2020       Advance Care Planning   I attest to discussing Advance Care Planning with patient and/or family member.  Education was provided including the importance of the Health Care Power of , Advance Directives, and/or [place] documentation.  The patient expressed understanding to the importance of this information and discussion.         Follow up in about 1 year (around 8/31/2023) for Wellness with PCP. In addition to their scheduled follow up, the patient has also been instructed to follow up on as needed basis.

## 2022-09-01 ENCOUNTER — TELEPHONE (OUTPATIENT)
Dept: FAMILY MEDICINE | Facility: CLINIC | Age: 82
End: 2022-09-01
Payer: MEDICARE

## 2022-09-01 DIAGNOSIS — Z00.00 WELLNESS EXAMINATION: Primary | ICD-10-CM

## 2022-09-01 DIAGNOSIS — E66.9 OBESITY, UNSPECIFIED CLASSIFICATION, UNSPECIFIED OBESITY TYPE, UNSPECIFIED WHETHER SERIOUS COMORBIDITY PRESENT: ICD-10-CM

## 2022-09-01 DIAGNOSIS — I10 HYPERTENSION, UNSPECIFIED TYPE: ICD-10-CM

## 2022-11-28 DIAGNOSIS — E78.5 HYPERLIPIDEMIA, UNSPECIFIED HYPERLIPIDEMIA TYPE: Primary | ICD-10-CM

## 2022-11-28 RX ORDER — FENOFIBRATE 48 MG/1
48 TABLET, FILM COATED ORAL DAILY
Qty: 90 TABLET | Refills: 1 | Status: SHIPPED | OUTPATIENT
Start: 2022-11-28 | End: 2023-06-13

## 2022-11-28 NOTE — TELEPHONE ENCOUNTER
----- Message from Lor Shannon sent at 11/28/2022  9:06 AM CST -----  Regarding: Med Refill  .Type:  RX Refill Request    Who Called: Pt  Refill or New Rx:Refill  RX Name and Strength:fenofibrate (TRICOR) 48 MG tablet  How is the patient currently taking it? (ex. 1XDay):1xday  Is this a 30 day or 90 day RX:  Preferred Pharmacy with phone number:[facility]/pharmacy #[id number] - [place], [place] - [address]  Local or Mail Order:Local  Ordering Provider:[place]  Would the patient rather a call back or a response via [facility]? Call back  Best Call Back Number:[phone]  Additional Information:

## 2022-12-05 PROBLEM — Z00.00 WELLNESS EXAMINATION: Status: RESOLVED | Noted: 2022-08-31 | Resolved: 2022-12-05

## 2022-12-15 RX ORDER — LEVOTHYROXINE SODIUM 50 UG/1
50 TABLET ORAL DAILY
Qty: 90 TABLET | Refills: 3 | Status: SHIPPED | OUTPATIENT
Start: 2022-12-15 | End: 2023-10-12

## 2022-12-15 RX ORDER — AMLODIPINE BESYLATE 5 MG/1
5 TABLET ORAL DAILY
Qty: 90 TABLET | Refills: 3 | Status: SHIPPED | OUTPATIENT
Start: 2022-12-15 | End: 2023-07-20 | Stop reason: ALTCHOICE

## 2023-01-03 ENCOUNTER — OFFICE VISIT (OUTPATIENT)
Dept: FAMILY MEDICINE | Facility: CLINIC | Age: 83
End: 2023-01-03
Payer: MEDICARE

## 2023-01-03 VITALS
HEART RATE: 76 BPM | HEIGHT: 69 IN | BODY MASS INDEX: 29.38 KG/M2 | WEIGHT: 198.38 LBS | OXYGEN SATURATION: 97 % | RESPIRATION RATE: 16 BRPM | SYSTOLIC BLOOD PRESSURE: 112 MMHG | TEMPERATURE: 99 F | DIASTOLIC BLOOD PRESSURE: 72 MMHG

## 2023-01-03 DIAGNOSIS — M25.551 RIGHT HIP PAIN: ICD-10-CM

## 2023-01-03 DIAGNOSIS — G47.33 OSA ON CPAP: ICD-10-CM

## 2023-01-03 DIAGNOSIS — Z01.818 PRE-OP EXAM: Primary | ICD-10-CM

## 2023-01-03 DIAGNOSIS — E78.5 HYPERLIPIDEMIA, UNSPECIFIED HYPERLIPIDEMIA TYPE: ICD-10-CM

## 2023-01-03 DIAGNOSIS — E03.9 HYPOTHYROIDISM, UNSPECIFIED TYPE: ICD-10-CM

## 2023-01-03 DIAGNOSIS — Z74.09 MOBILITY IMPAIRED: ICD-10-CM

## 2023-01-03 DIAGNOSIS — D64.9 ANEMIA, UNSPECIFIED TYPE: ICD-10-CM

## 2023-01-03 DIAGNOSIS — I10 PRIMARY HYPERTENSION: ICD-10-CM

## 2023-01-03 PROCEDURE — 99214 OFFICE O/P EST MOD 30 MIN: CPT | Mod: ,,, | Performed by: FAMILY MEDICINE

## 2023-01-03 PROCEDURE — 99214 PR OFFICE/OUTPT VISIT, EST, LEVL IV, 30-39 MIN: ICD-10-PCS | Mod: ,,, | Performed by: FAMILY MEDICINE

## 2023-01-03 RX ORDER — IBUPROFEN 200 MG
400 TABLET ORAL 3 TIMES DAILY PRN
COMMUNITY
End: 2023-04-03

## 2023-01-03 NOTE — PROGRESS NOTES
Subjective:        Patient ID: Eloy Patel is a 82 y.o. male.    Chief Complaint: Follow-up (Sx clearance: right total hip to be done on 1/26/2023/)      presents to clinic with family member for pre op exam. He is scheduled for right total hip with dr. Duff 1/26/23.  He has paperwork to complete.  Has completed cbc, cmp, ua and a1c as requested.  Has also gotten clearance from dr. Cali, cards, on 10/2022.   Denies chest pain or shortness of breath. Rides his stationary bike without chest pain. No problems with anesthesia in the past.  Not on blood thinners.  Is on advil. Has instructions to hold his advil.  Has appt with dr. duff on 1/18/23        psa was elevated. has not been elevated in past. has appt with dr. douglas, urology.    he is anemic. no blood in urine. not seeing blood in stool. has never been tested for iron.    He has hypertension and is prescribed lisinopril 20mg po bid and hydrochlorothiazide 25mg qam and norvasc 5mg. he has been monitoring his blood pressure at home and reports compliance with meds. Denies any chest pain or headache. was told to stop asa due to blood in urine per urologist. He also has  hyperlipidemia and is on TriCor    he reports he cannot walk without assistive device. 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 wants to see podiatry. he usually walks with a cane. Ambulates with rolling walker.  has handicap tag.     He also has hypothyroidism and is on Synthroid    He has sleep apnea and is on a CPAP. he is tolerating his cpap well.    Has been seen by Dr. Canseco for his multiple moles in January 2019. Had biopsy done and lesion removed on right neck with Dr. Talamantes at [facility] last year.    He had a colonoscopy with Dr. Rivera 6/24/19 which was good and was told he did not need to have another one. he had bilateral inguinal hernia repair surgery with dr. talamantes in 9/2019. he is doing well.    He is allergic to Bactrim. He does not smoke. He  "drinks alcohol on occasion. Pneumovax 3/2019 thinks had tetanus at [facility]. He is due for his wellness visit in august.     Review of Systems   Constitutional: Negative.    HENT: Negative.     Eyes: Negative.    Respiratory: Negative.     Cardiovascular: Negative.    Gastrointestinal: Negative.    Endocrine: Negative.    Genitourinary: Negative.    Musculoskeletal:  Positive for arthralgias and gait problem.   Skin: Negative.    Allergic/Immunologic: Negative.    Hematological: Negative.    Psychiatric/Behavioral: Negative.     All other systems reviewed and are negative.      Review of patient's allergies indicates:   Allergen Reactions    Sulfamethoxazole-trimethoprim      Other reaction(s): vomiting    Sulfa (sulfonamide antibiotics) Rash and Itching      Vitals:    01/03/23 1157   BP: 112/72   Pulse: 76   Resp: 16   Temp: 98.8 °F (37.1 °C)   TempSrc: Temporal   SpO2: 97%   Weight: 90 kg (198 lb 6.4 oz)   Height: 5' 9" (1.753 m)      Social History     Socioeconomic History    Marital status:    Occupational History    Occupation: retired   Tobacco Use    Smoking status: Former     Types: Cigarettes     Passive exposure: Never    Smokeless tobacco: Never   Substance and Sexual Activity    Alcohol use: Yes     Comment: occassionally    Drug use: Never      History reviewed. No pertinent family history.       Objective:     Physical Exam  Vitals and nursing note reviewed.   Constitutional:       Appearance: Normal appearance.   HENT:      Head: Normocephalic.      Nose: Nose normal.      Mouth/Throat:      Mouth: Mucous membranes are moist.      Pharynx: Oropharynx is clear.   Eyes:      Extraocular Movements: Extraocular movements intact.   Cardiovascular:      Rate and Rhythm: Normal rate and regular rhythm.   Pulmonary:      Effort: Pulmonary effort is normal.      Breath sounds: Normal breath sounds.   Musculoskeletal:         General: Normal range of motion.      Comments: Ambulates with rolling walker "   Skin:     General: Skin is warm and dry.   Neurological:      General: No focal deficit present.      Mental Status: He is alert and oriented to person, place, and time. Mental status is at baseline.   Psychiatric:         Mood and Affect: Mood normal.     Current Outpatient Medications on File Prior to Visit   Medication Sig Dispense Refill    amLODIPine (NORVASC) 5 MG tablet Take 1 tablet (5 mg total) by mouth once daily. 90 tablet 3    fenofibrate (TRICOR) 48 MG tablet Take 1 tablet (48 mg total) by mouth once daily. 90 tablet 1    hydroCHLOROthiazide (HYDRODIURIL) 25 MG tablet TAKE 1 TABLET BY MOUTH EVERY DAY 90 tablet 0    ibuprofen (ADVIL,MOTRIN) 200 MG tablet Take 400 mg by mouth 3 (three) times daily as needed.      levothyroxine (SYNTHROID) 50 MCG tablet Take 1 tablet (50 mcg total) by mouth once daily. 90 tablet 3    lisinopriL (PRINIVIL,ZESTRIL) 20 MG tablet Take 20 mg by mouth 2 (two) times daily.      multivit-min-FA-lycopen-lutein 300-600-300 mcg Tab Take 1 tablet by mouth once daily.      tamsulosin (FLOMAX) 0.4 mg Cap Take 0.4 mg by mouth once daily at 6am.       No current facility-administered medications on file prior to visit.     Health Maintenance   Topic Date Due    TETANUS VACCINE  Never done    Lipid Panel  08/31/2027      Results for orders placed or performed in visit on 08/31/22   Urinalysis, Reflex to Urine Culture Urine, Clean Catch    Specimen: Urine   Result Value Ref Range    Color, UA Yellow Yellow, Colorless, Other, Clear    Appearance, UA Clear Clear    Specific Gravity, UA 1.020     pH, UA 6.0 5.0, 5.5, 6.0, 6.5, 7.0, 7.5, 8.0, 8.5    Protein, UA Negative Negative, 300  mg/dL    Glucose, UA Negative Negative, Normal mg/dL    Ketones, UA Negative Negative, +1, +2, +3, +4, +5, >=160, >=80 mg/dL    Blood, UA Trace (A) Negative unit/L    Bilirubin, UA Negative Negative mg/dL    Urobilinogen, UA 0.2 0.2, 1.0, Normal mg/dL    Nitrites, UA Negative Negative    Leukocyte Esterase, UA  Negative Negative, 75  unit/L   Urinalysis, Microscopic   Result Value Ref Range    Bacteria, UA None Seen None Seen, Rare, Occasional /HPF    RBC, UA 0-2 None Seen, 0-2, 3-5, 0-5 /HPF    WBC, UA 0-2 None Seen, 0-2, 3-5, 0-5 /HPF    Squamous Epithelial Cells, UA Rare None Seen, Rare, Occasional, Occ /HPF          Assessment & Plan:     Active Problem List with Overview Notes    Diagnosis Date Noted    Pre-op exam 01/03/2023    Anemia, unspecified     Primary hypertension 08/31/2022    Hyperlipidemia 08/31/2022    WOLF on CPAP 08/31/2022    Hypothyroidism, unspecified 08/31/2022    Right hip pain 08/31/2022    Chronic pain of both knees 08/31/2022    Mobility impaired 08/31/2022    History of elevated PSA 08/31/2022    Need for vaccination against Streptococcus pneumoniae 08/31/2022    Encounter for prostate cancer screening 08/31/2022    Murmur, cardiac 08/31/2022       1. Pre-op exam  Assessment & Plan:  Previously done labs reviewed at time of visit.  Has cardiac clearance from dr. Cali. He denies chest pain or shortness of breath.  No problems with anesthesia.  Not on thinners.  Paperwork will be completed and faxed to dr. Thorne office. Patient may proceed with planned surgery.       2. Right hip pain  Assessment & Plan:  See pre op A&P      3. Primary hypertension  Assessment & Plan:  Well controlled.  Continue current prescription medication. Keep appts with cards      4. Hyperlipidemia, unspecified hyperlipidemia type  Assessment & Plan:  Continue on Tricor 48 mg po daily. Keep appts with cards        5. Anemia, unspecified type  Assessment & Plan:  Monitor.       6. Hypothyroidism, unspecified type  Assessment & Plan:  Lab Results   Component Value Date    TSH 2.4374 08/31/2022     Stable on current prescription meds      7. Mobility impaired  Assessment & Plan:  Has handicap tag      8. WOLF on CPAP  Assessment & Plan:  Reports compliance and benefits from continued use             Keep scheduled  wellness 9/2023 with labs

## 2023-01-25 NOTE — ASSESSMENT & PLAN NOTE
Previously done labs reviewed at time of visit.  Has cardiac clearance from dr. Cali. He denies chest pain or shortness of breath.  No problems with anesthesia.  Not on thinners.  Paperwork will be completed and faxed to dr. Thorne office. Patient may proceed with planned surgery.

## 2023-01-25 NOTE — ASSESSMENT & PLAN NOTE
Lab Results   Component Value Date    TSH 2.4374 08/31/2022     Stable on current prescription meds

## 2023-01-30 RX ORDER — LISINOPRIL 20 MG/1
20 TABLET ORAL 2 TIMES DAILY
Qty: 90 TABLET | Refills: 3 | Status: SHIPPED | OUTPATIENT
Start: 2023-01-30 | End: 2023-05-24

## 2023-01-30 NOTE — TELEPHONE ENCOUNTER
----- Message from Giovana Chin sent at 1/30/2023  8:54 AM CST -----  Regarding: med refill  .Type:  RX Refill Request    Who Called: pt   Refill or New Rx:refill   RX Name and Strength:lisinopriL (PRINIVIL,ZESTRIL) 20 MG tablet  How is the patient currently taking it? (ex. 1XDay):1xday   Is this a 30 day or 90 day RX:90  Preferred Pharmacy with phone number:[id number] [place] [facility] [address] [facility] [place]  Local or Mail Order:local   Ordering Provider:joslyn   Would the patient rather a call back or a response via [facility]? Call back   Best Call Back Number:[phone]  Additional Information:

## 2023-03-09 ENCOUNTER — TELEPHONE (OUTPATIENT)
Dept: FAMILY MEDICINE | Facility: CLINIC | Age: 83
End: 2023-03-09
Payer: MEDICARE

## 2023-03-09 NOTE — TELEPHONE ENCOUNTER
Spoke with dr. Yu.  Patient recently had aortic valve replacement and cabg with him.  Doing well. Doing cardiac rehab.

## 2023-03-09 NOTE — TELEPHONE ENCOUNTER
----- Message from Adela Barba LPN sent at 3/7/2023  1:37 PM CST -----  Regarding: FW: General Message    ----- Message -----  From: Su Stein  Sent: 3/7/2023  12:32 PM CST  To: [facility] Staff  Subject: General Message                                  General Message:       Dr Gee Leon @ [phone], is calling for Dr Corrales to give him a call about the above patient, he called during lunch hour, wasn't able to call the back line.

## 2023-04-03 ENCOUNTER — OFFICE VISIT (OUTPATIENT)
Dept: FAMILY MEDICINE | Facility: CLINIC | Age: 83
End: 2023-04-03
Payer: MEDICARE

## 2023-04-03 VITALS
BODY MASS INDEX: 29.3 KG/M2 | DIASTOLIC BLOOD PRESSURE: 74 MMHG | HEIGHT: 69 IN | RESPIRATION RATE: 18 BRPM | HEART RATE: 84 BPM | SYSTOLIC BLOOD PRESSURE: 130 MMHG | OXYGEN SATURATION: 96 %

## 2023-04-03 DIAGNOSIS — I10 PRIMARY HYPERTENSION: Primary | ICD-10-CM

## 2023-04-03 DIAGNOSIS — J90 PLEURAL EFFUSION, BILATERAL: ICD-10-CM

## 2023-04-03 PROBLEM — Z09 HOSPITAL DISCHARGE FOLLOW-UP: Status: ACTIVE | Noted: 2023-04-03

## 2023-04-03 PROCEDURE — 99214 OFFICE O/P EST MOD 30 MIN: CPT | Mod: ,,, | Performed by: NURSE PRACTITIONER

## 2023-04-03 PROCEDURE — 99214 PR OFFICE/OUTPT VISIT, EST, LEVL IV, 30-39 MIN: ICD-10-PCS | Mod: ,,, | Performed by: NURSE PRACTITIONER

## 2023-04-03 RX ORDER — APIXABAN 5 MG/1
5 TABLET, FILM COATED ORAL 2 TIMES DAILY
COMMUNITY
Start: 2023-03-30 | End: 2023-07-20 | Stop reason: ALTCHOICE

## 2023-04-03 RX ORDER — ATORVASTATIN CALCIUM 20 MG/1
20 TABLET, FILM COATED ORAL NIGHTLY
COMMUNITY
Start: 2023-03-22 | End: 2023-09-07 | Stop reason: SDUPTHER

## 2023-04-03 RX ORDER — FUROSEMIDE 20 MG/1
20 TABLET ORAL
COMMUNITY
Start: 2023-03-30 | End: 2023-07-20

## 2023-04-03 RX ORDER — ACETAMINOPHEN 325 MG/1
TABLET, FILM COATED ORAL
COMMUNITY
Start: 2023-01-23

## 2023-04-03 RX ORDER — ASPIRIN 81 MG/1
81 TABLET ORAL
COMMUNITY
Start: 2023-01-30 | End: 2023-04-03

## 2023-04-03 RX ORDER — AMIODARONE HYDROCHLORIDE 200 MG/1
200 TABLET ORAL
COMMUNITY
Start: 2023-03-30 | End: 2023-09-25 | Stop reason: ALTCHOICE

## 2023-04-03 NOTE — PROGRESS NOTES
Subjective:      Patient ID: Eloy Patel is a 83 y.o. White male      Chief Complaint: Follow-up (Hospital f/u)       Past Medical History:   Diagnosis Date    Anemia, unspecified     Hypothyroidism     Mobility impaired     Obesity     WOLF on CPAP         HPI  Presents to clinic for ED follow up.    Seen in ED on with complaints of elevated blood pressure and SOB.  Pt is S/P s/p cabg aortic valve replacement/CABG x 1 month ago.  Recently evaluated per CV surgeon and noted to have  bilateral pleural effusions per CXR.  He was started on Levaquin.  He also takes Eliquis, Lasix, and Pacerone.  /74 today.  States SOB much improved.  Currently taking Norvac 5 mg po daily (prescribed per ED) and Lisinopril 20 g po daily.  Denies any SOB, WHITESIDE, CP, or SOB.  Denies any other problems.     Follow up:  Has upcoming appt with Dr. Yu on 4/6/2023         Review of Systems   Constitutional:  Negative for chills, fatigue, fever and unexpected weight change.   HENT: Negative.     Eyes: Negative.    Respiratory: Negative.  Negative for shortness of breath.    Cardiovascular: Negative.  Negative for chest pain.   Gastrointestinal: Negative.    Endocrine: Negative.    Genitourinary: Negative.    Musculoskeletal: Negative.    Integumentary:  Negative.   Allergic/Immunologic: Negative.    Neurological: Negative.  Negative for weakness.   Hematological: Negative.    Psychiatric/Behavioral: Negative.     All other systems reviewed and are negative.       Objective:      Vitals:    04/03/23 1000   BP: 130/74   Pulse:    Resp:       Body mass index is 29.3 kg/m².     Physical Exam  Vitals reviewed.   Constitutional:       Appearance: He is not toxic-appearing.   HENT:      Head: Normocephalic.      Mouth/Throat:      Mouth: Mucous membranes are moist.   Eyes:      Extraocular Movements: Extraocular movements intact.      Pupils: Pupils are equal, round, and reactive to light.   Cardiovascular:      Rate and Rhythm: Normal rate  and regular rhythm.      Pulses: Normal pulses.      Heart sounds: Normal heart sounds.   Pulmonary:      Effort: Pulmonary effort is normal. No respiratory distress.      Breath sounds: Normal breath sounds.   Musculoskeletal:         General: No tenderness. Normal range of motion.      Cervical back: Neck supple.      Right lower le+ Edema present.      Left lower le+ Edema present.   Skin:     General: Skin is warm and dry.   Neurological:      Mental Status: He is alert and oriented to person, place, and time.   Psychiatric:         Mood and Affect: Mood normal.          Current Outpatient Medications:     amiodarone (PACERONE) 200 MG Tab, Take 200 mg by mouth., Disp: , Rfl:     amLODIPine (NORVASC) 5 MG tablet, Take 1 tablet (5 mg total) by mouth once daily., Disp: 90 tablet, Rfl: 3    atorvastatin (LIPITOR) 20 MG tablet, Take 20 mg by mouth every evening., Disp: , Rfl:     ELIQUIS 5 mg Tab, Take 5 mg by mouth 2 (two) times daily., Disp: , Rfl:     fenofibrate (TRICOR) 48 MG tablet, Take 1 tablet (48 mg total) by mouth once daily., Disp: 90 tablet, Rfl: 1    LASIX 20 mg tablet, Take 20 mg by mouth., Disp: , Rfl:     levothyroxine (SYNTHROID) 50 MCG tablet, Take 1 tablet (50 mcg total) by mouth once daily., Disp: 90 tablet, Rfl: 3    lisinopriL (PRINIVIL,ZESTRIL) 20 MG tablet, Take 1 tablet (20 mg total) by mouth 2 (two) times daily., Disp: 90 tablet, Rfl: 3    multivit-min-FA-lycopen-lutein 300-600-300 mcg Tab, Take 1 tablet by mouth once daily., Disp: , Rfl:     tamsulosin (FLOMAX) 0.4 mg Cap, Take 0.4 mg by mouth once daily at 6am., Disp: , Rfl:     TYLENOL 325 mg tablet, Take by mouth as needed., Disp: , Rfl:     Assessment & Plan:     Problem List Items Addressed This Visit          Pulmonary    Pleural effusion, bilateral     Stable  Continue Levaquin and Lasix as prescribed   Keep appt with Dr. Yu on 2023 for reevaluation  Instructed to continue to monitor symptoms  Report to ED for any  CP, SOB, and/or worsening symptoms  Pt is agreeable to plan and verbalizes understanding                Cardiac/Vascular    Primary hypertension - Primary     BP at goal  Continue current medication  Daily BP check; bring log all clinic visits  Instructed to report to ED for any BP greater than 200/100, dizziness, syncope, CP, or SOB  Keep appt. With PCP for follow up  Patient is agreeable to plan and verbalizes understanding                 Prior to the patient's arrival on the same day, I spent (5) minutes reviewing chart. Once in the exam room with the patient, I spent (14 ) minutes in the room with the member performing a history and exam as well as reviewing the test results and recommendations with the patient. After leaving the exam room, I spent an additional (11 ) minutes completing the electronic health record. The total time spent that day caring for the member is (30) minutes, and this time - including the breakdown - is documented in the medical record.

## 2023-04-27 ENCOUNTER — DOCUMENT SCAN (OUTPATIENT)
Dept: HOME HEALTH SERVICES | Facility: HOSPITAL | Age: 83
End: 2023-04-27
Payer: MEDICARE

## 2023-05-24 RX ORDER — LISINOPRIL 20 MG/1
TABLET ORAL
Qty: 180 TABLET | Refills: 1 | Status: SHIPPED | OUTPATIENT
Start: 2023-05-24 | End: 2023-09-25

## 2023-06-13 DIAGNOSIS — E78.5 HYPERLIPIDEMIA, UNSPECIFIED HYPERLIPIDEMIA TYPE: ICD-10-CM

## 2023-06-13 RX ORDER — FENOFIBRATE 48 MG/1
TABLET, FILM COATED ORAL
Qty: 90 TABLET | Refills: 3 | Status: SHIPPED | OUTPATIENT
Start: 2023-06-13 | End: 2023-09-07

## 2023-07-03 PROBLEM — Z09 HOSPITAL DISCHARGE FOLLOW-UP: Status: RESOLVED | Noted: 2023-04-03 | Resolved: 2023-07-03

## 2023-07-18 LAB
CHOLEST SERPL-MSCNC: 131 MG/DL (ref 0–200)
HDLC SERPL-MCNC: 49 MG/DL (ref 35–70)
LDLC SERPL CALC-MCNC: 70 MG/DL (ref 0–160)
TRIGL SERPL-MCNC: 57 MG/DL (ref 40–160)

## 2023-07-20 ENCOUNTER — DOCUMENTATION ONLY (OUTPATIENT)
Dept: FAMILY MEDICINE | Facility: CLINIC | Age: 83
End: 2023-07-20
Payer: MEDICARE

## 2023-07-20 ENCOUNTER — TELEPHONE (OUTPATIENT)
Dept: FAMILY MEDICINE | Facility: CLINIC | Age: 83
End: 2023-07-20
Payer: MEDICARE

## 2023-07-20 RX ORDER — FUROSEMIDE 40 MG/1
40 TABLET ORAL 2 TIMES DAILY
COMMUNITY
Start: 2023-05-19 | End: 2024-03-04 | Stop reason: SDUPTHER

## 2023-07-20 RX ORDER — POTASSIUM CHLORIDE 20 MEQ/1
20 TABLET, EXTENDED RELEASE ORAL
COMMUNITY
Start: 2023-05-19

## 2023-07-20 RX ORDER — ASPIRIN 81 MG
81 TABLET, DELAYED RELEASE (ENTERIC COATED) ORAL
COMMUNITY
Start: 2023-07-18

## 2023-07-20 RX ORDER — IPRATROPIUM BROMIDE AND ALBUTEROL SULFATE 2.5; .5 MG/3ML; MG/3ML
SOLUTION RESPIRATORY (INHALATION)
COMMUNITY
Start: 2023-06-19

## 2023-07-20 NOTE — TELEPHONE ENCOUNTER
----- Message from Jonelle Dewey sent at 7/19/2023  4:29 PM CDT -----  Regarding: med adv  Type:  Needs Medical Advice    Who Called: patient  Pharmacy name and phone #:  [facility] on [address]  Would the patient rather a call back or a response via [facility]?   Best Call Back Number: [phone]  Additional Information: patient has questions about his medications that Dr. Corrales prescribes such as fenofibrate (TRICOR) 48 MG tablet and atorvastatin (LIPITOR) 20 MG tablet. Please call patient back.

## 2023-07-20 NOTE — TELEPHONE ENCOUNTER
Patient and I updated his med list. He said in the future he will need to request refills of atorvastatin and fenofibrate with us.

## 2023-08-29 ENCOUNTER — TELEPHONE (OUTPATIENT)
Dept: FAMILY MEDICINE | Facility: CLINIC | Age: 83
End: 2023-08-29
Payer: MEDICARE

## 2023-08-29 NOTE — TELEPHONE ENCOUNTER
----- Message from Sis Acevedo sent at 8/29/2023  8:59 AM CDT -----  Regarding: home health req  Type:  Needs Medical Advice    Who Called: [facility]    Would the patient rather a call back or a response via [facility]? C/b  Best Call Back Number: [phone]    Additional Information: pt is being released from [facility] in a day or two and HH needs to know if PCP will sign off on the plan of care for HH set up by the [facility]

## 2023-08-30 PROCEDURE — G0180 MD CERTIFICATION HHA PATIENT: HCPCS | Mod: ,,, | Performed by: FAMILY MEDICINE

## 2023-08-30 PROCEDURE — G0180 PR HOME HEALTH MD CERTIFICATION: ICD-10-PCS | Mod: ,,, | Performed by: FAMILY MEDICINE

## 2023-09-07 ENCOUNTER — TELEPHONE (OUTPATIENT)
Dept: FAMILY MEDICINE | Facility: CLINIC | Age: 83
End: 2023-09-07
Payer: MEDICARE

## 2023-09-07 DIAGNOSIS — E78.2 MIXED HYPERLIPIDEMIA: Primary | ICD-10-CM

## 2023-09-07 RX ORDER — ATORVASTATIN CALCIUM 40 MG/1
40 TABLET, FILM COATED ORAL NIGHTLY
Qty: 90 TABLET | Refills: 3 | Status: SHIPPED | OUTPATIENT
Start: 2023-09-07 | End: 2024-09-06

## 2023-09-07 NOTE — TELEPHONE ENCOUNTER
Patient would like to increase lipitor to 40. Will finish his 20 mg tabs please send in new script for 40 mg

## 2023-09-07 NOTE — TELEPHONE ENCOUNTER
I called the patient for more info. He said that the pharmacy told him that his atorvastatin may have a reaction to fenofibrate. He would like to know if he should change his medications or what type of reaction to look out for? Please advise.

## 2023-09-07 NOTE — TELEPHONE ENCOUNTER
Fenofibrate and atorvastatin may cause increased muscle aches.  We can try holding the fenofibrate and increasing his lipitor from 20mg to 40mg instead.  If this is acceptable, let me know, I can send in rx for 40mg of lipitor (he can just start taking two of his 20mg tabs to equal the 40mg daily) so he does not waste what he has.

## 2023-09-07 NOTE — TELEPHONE ENCOUNTER
----- Message from Uyen Goins sent at 9/7/2023  9:13 AM CDT -----  Regarding: advice  Type:  Needs Medical Advice    Who Called: pt  Symptoms (please be specific):    How long has patient had these symptoms:    Pharmacy name and phone #:    Would the patient rather a call back or a response via [facility]?   Best Call Back Number: [phone]  Additional Information: pt called about the pharmacy telling him that fenofibrate (TRICOR) 48 MG tablet and may have a reaction with each other and is needing to speak with a nurse about having it changed

## 2023-09-07 NOTE — TELEPHONE ENCOUNTER
I have signed for the following orders AND/OR meds.  Please call the patient and ask the patient to schedule the testing AND/OR inform about any medications that were sent.        Medications Ordered This Encounter   Medications    atorvastatin (LIPITOR) 40 MG tablet     Sig: Take 1 tablet (40 mg total) by mouth every evening.     Dispense:  90 tablet     Refill:  3

## 2023-09-14 ENCOUNTER — OFFICE VISIT (OUTPATIENT)
Dept: FAMILY MEDICINE | Facility: CLINIC | Age: 83
End: 2023-09-14
Payer: MEDICARE

## 2023-09-14 VITALS
DIASTOLIC BLOOD PRESSURE: 66 MMHG | RESPIRATION RATE: 16 BRPM | SYSTOLIC BLOOD PRESSURE: 118 MMHG | TEMPERATURE: 99 F | HEIGHT: 69 IN | BODY MASS INDEX: 27.05 KG/M2 | HEART RATE: 77 BPM | OXYGEN SATURATION: 99 % | WEIGHT: 182.63 LBS

## 2023-09-14 DIAGNOSIS — M25.551 RIGHT HIP PAIN: ICD-10-CM

## 2023-09-14 DIAGNOSIS — Z09 HOSPITAL DISCHARGE FOLLOW-UP: Primary | ICD-10-CM

## 2023-09-14 DIAGNOSIS — J90 PLEURAL EFFUSION, BILATERAL: ICD-10-CM

## 2023-09-14 DIAGNOSIS — I10 PRIMARY HYPERTENSION: ICD-10-CM

## 2023-09-14 DIAGNOSIS — E78.2 MIXED HYPERLIPIDEMIA: ICD-10-CM

## 2023-09-14 DIAGNOSIS — Z74.09 MOBILITY IMPAIRED: ICD-10-CM

## 2023-09-14 PROCEDURE — 99214 PR OFFICE/OUTPT VISIT, EST, LEVL IV, 30-39 MIN: ICD-10-PCS | Mod: ,,, | Performed by: FAMILY MEDICINE

## 2023-09-14 PROCEDURE — 99214 OFFICE O/P EST MOD 30 MIN: CPT | Mod: ,,, | Performed by: FAMILY MEDICINE

## 2023-09-14 RX ORDER — DOCUSATE SODIUM 100 MG/1
1 CAPSULE, LIQUID FILLED ORAL NIGHTLY
COMMUNITY

## 2023-09-14 NOTE — PROGRESS NOTES
Subjective:        Patient ID: Eloy Patel is a 83 y.o. male.    Chief Complaint: Follow-up (Hospital follow up )      presents to clinic with his daughter and son for hospital discharge follow up.  He is due for his wellness visit in august.     He was inpatient at [facility] on 8/23/23-8/29/23 for planned pleurodesis with dr. 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 He saw pulmonology while he was there- dr. Cortes- as well.  He was discharged home on pain medications.  He is in a wheelchair today.  Taking tylenol prn.   No pain from surgery site.  Saw dr. Yu this am. Did cxr and was ok.  He also saw dr. Cortes on 9/13/23.  They were ok with proceeding with hip surgery with dr. Thorne.  They do not have a surgery date yet.he was scheduled previously in 1/2023 but was cancelled.     He denies cp or sob.  Doing PT.  When doing dishes is not sob or chest pain. He is not able to go up flight of stairs due to his hip pain.  On asa. No problems with anesthesia        psa was elevated. has not been elevated in past. has appt with dr. douglas, urology.     he is anemic. no blood in urine. not seeing blood in stool. has never been tested for iron.     He has hypertension and is prescribed lisinopril 20mg po bid and hydrochlorothiazide 25mg qam and norvasc 5mg. he has been monitoring his blood pressure at home and reports compliance with meds. Denies any chest pain or headache. was told to stop asa due to blood in urine per urologist. He also has hyperlipidemia and is on TriCor. His cards is dr. Ann.     he reports he cannot walk without assistive device. 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 wants to see podiatry. he usually walks with a cane. Ambulates with rolling walker.  has handicap tag.      He also has hypothyroidism and is on Synthroid     He has sleep apnea and is on a CPAP. he is tolerating his cpap well.     Has been seen by Dr. Canseco for his multiple  "moles in January 2019. Had biopsy done and lesion removed on right neck with Dr. Fernandes at [facility] last year.     He had a colonoscopy with Dr. Rivera 6/24/19 which was good and was told he did not need to have another one. he had bilateral inguinal hernia repair surgery with dr. fernandes in 9/2019. he is doing well.     He is allergic to Bactrim. He does not smoke. He drinks alcohol on occasion. Pneumovax 3/2019 thinks had tetanus at [facility].             Transitional Care Note    Family and/or Caretaker present at visit?  Yes.  Diagnostic tests reviewed/disposition: No diagnosic tests pending after this hospitalization.  Disease/illness education: discussed  Home health/community services discussion/referrals: Patient has home health established at [facility].   Establishment or re-establishment of referral orders for community resources: No other necessary community resources.   Discussion with other health care providers: No discussion with other health care providers necessary.              Review of Systems   Constitutional: Negative.  Negative for fever.   HENT: Negative.     Eyes: Negative.    Respiratory: Negative.  Negative for cough and shortness of breath.    Cardiovascular: Negative.  Negative for chest pain.   Gastrointestinal: Negative.    Endocrine: Negative.    Genitourinary: Negative.    Musculoskeletal: Negative.    Skin: Negative.    Allergic/Immunologic: Negative.    Neurological: Negative.    Hematological: Negative.    Psychiatric/Behavioral: Negative.     All other systems reviewed and are negative.        Review of patient's allergies indicates:   Allergen Reactions    Sulfamethoxazole-trimethoprim      Other reaction(s): vomiting    Sulfa (sulfonamide antibiotics) Rash and Itching      Vitals:    09/14/23 1532   BP: 118/66   BP Location: Left arm   Pulse: 77   Resp: 16   Temp: 98.8 °F (37.1 °C)   TempSrc: Temporal   SpO2: 99%   Weight: 82.8 kg (182 lb 9.6 oz)   Height: 5' 9" (1.753 m)    "   Social History     Socioeconomic History    Marital status:    Occupational History    Occupation: retired   Tobacco Use    Smoking status: Former     Types: Cigarettes     Passive exposure: Never    Smokeless tobacco: Never   Substance and Sexual Activity    Alcohol use: Yes     Comment: occassionally    Drug use: Never      History reviewed. No pertinent family history.       Objective:     Physical Exam  Vitals and nursing note reviewed.   Constitutional:       Appearance: Normal appearance. He is normal weight.   HENT:      Head: Normocephalic.      Nose: Nose normal.      Mouth/Throat:      Mouth: Mucous membranes are moist.      Pharynx: Oropharynx is clear.   Eyes:      Extraocular Movements: Extraocular movements intact.   Cardiovascular:      Rate and Rhythm: Normal rate and regular rhythm.   Pulmonary:      Effort: Pulmonary effort is normal.      Breath sounds: Normal breath sounds.   Musculoskeletal:         General: Normal range of motion.      Comments: Sitting in wheelchair   Skin:     General: Skin is warm and dry.   Neurological:      General: No focal deficit present.      Mental Status: He is alert and oriented to person, place, and time. Mental status is at baseline.   Psychiatric:         Mood and Affect: Mood normal.       Current Outpatient Medications on File Prior to Visit   Medication Sig Dispense Refill    ADULT LOW DOSE ASPIRIN 81 mg EC tablet Take 81 mg by mouth.      atorvastatin (LIPITOR) 40 MG tablet Take 1 tablet (40 mg total) by mouth every evening. 90 tablet 3    furosemide (LASIX) 40 MG tablet Take 40 mg by mouth 2 (two) times daily.      levothyroxine (SYNTHROID) 50 MCG tablet Take 1 tablet (50 mcg total) by mouth once daily. 90 tablet 3    lisinopriL (PRINIVIL,ZESTRIL) 20 MG tablet TAKE 1 TABLET BY MOUTH TWICE A  tablet 1    multivit-min-FA-lycopen-lutein 300-600-300 mcg Tab Take 1 tablet by mouth once daily.      potassium chloride SA (K-DUR,KLOR-CON) 20 MEQ  tablet Take 20 mEq by mouth.      tamsulosin (FLOMAX) 0.4 mg Cap Take 0.4 mg by mouth once daily at 6am.      albuterol-ipratropium (DUO-NEB) 2.5 mg-0.5 mg/3 mL nebulizer solution Take by nebulization.      amiodarone (PACERONE) 200 MG Tab Take 200 mg by mouth.      docusate sodium (COLACE) 100 MG capsule Take 1 capsule by mouth every evening.      TYLENOL 325 mg tablet Take by mouth as needed.       No current facility-administered medications on file prior to visit.     Health Maintenance   Topic Date Due    TETANUS VACCINE  Never done    Lipid Panel  07/18/2028    Shingles Vaccine  Completed      Results for orders placed or performed in visit on 07/20/23   Lipid Panel   Result Value Ref Range    Cholesterol 131 0 - 200 mg/dL    Triglycerides 57 40 - 160 mg/dL    HDL 49 35 - 70 mg/dL    LDL Calculated 70 0 - 160 MG/DL          Assessment & Plan:     Active Problem List with Overview Notes    Diagnosis Date Noted    Hospital discharge follow-up 04/03/2023    Pleural effusion, bilateral 04/03/2023    Pre-op exam 01/03/2023    Anemia, unspecified     Primary hypertension 08/31/2022    Hyperlipidemia 08/31/2022    WOLF on CPAP 08/31/2022    Hypothyroidism, unspecified 08/31/2022    Right hip pain 08/31/2022    Chronic pain of both knees 08/31/2022    Mobility impaired 08/31/2022    History of elevated PSA 08/31/2022    Need for vaccination against Streptococcus pneumoniae 08/31/2022    Encounter for prostate cancer screening 08/31/2022    Murmur, cardiac 08/31/2022       1. Hospital discharge follow-up  Assessment & Plan:  Inpatient 8/23/23-8/29/23 for planned pleurodesis with dr. Yu.  Home now. Doing well. Discharged home with amedysis.  Saw dr. palma while inpatient as well.  On tylenol prn for pain.  Has had cards and pulm follow up since his surgery.       2. Pleural effusion, bilateral  Assessment & Plan:  See hospital discharge follow up A&P      3. Right hip pain  Assessment & Plan:  States cards and pulm  are ok with him proceeding with planned hip surgery with dr. Thorne.  Advised once he has a surgery date, he will likely need to see me and cards for clearance prior to surgery      4. Mobility impaired  Assessment & Plan:  In wheelchair today.  Discussed fall precuations      5. Primary hypertension  Assessment & Plan:  Well controlled. On asa.  Continue current prescription medication. Keep appts with cards      6. Mixed hyperlipidemia  Assessment & Plan:  Continue on Tricor 48 mg po daily. Keep appts with cards             Follow up in about 4 weeks (around 10/12/2023) for Medicare Wellness with labs.

## 2023-09-18 NOTE — ASSESSMENT & PLAN NOTE
Inpatient 8/23/23-8/29/23 for planned pleurodesis with dr. Yu.  Home now. Doing well. Discharged home with amedysis.  Saw dr. palma while inpatient as well.  On tylenol prn for pain.  Has had cards and pulm follow up since his surgery.

## 2023-09-18 NOTE — ASSESSMENT & PLAN NOTE
States mooney and pulm are ok with him proceeding with planned hip surgery with dr. Thorne.  Advised once he has a surgery date, he will likely need to see me and jesica for clearance prior to surgery

## 2023-09-21 ENCOUNTER — TELEPHONE (OUTPATIENT)
Dept: FAMILY MEDICINE | Facility: CLINIC | Age: 83
End: 2023-09-21
Payer: MEDICARE

## 2023-09-21 ENCOUNTER — EXTERNAL HOME HEALTH (OUTPATIENT)
Dept: HOME HEALTH SERVICES | Facility: HOSPITAL | Age: 83
End: 2023-09-21
Payer: MEDICARE

## 2023-09-21 DIAGNOSIS — E78.5 HYPERLIPIDEMIA, UNSPECIFIED HYPERLIPIDEMIA TYPE: ICD-10-CM

## 2023-09-21 DIAGNOSIS — R79.9 ABNORMAL BLOOD CHEMISTRY: ICD-10-CM

## 2023-09-21 DIAGNOSIS — Z00.00 WELLNESS EXAMINATION: Primary | ICD-10-CM

## 2023-09-21 NOTE — TELEPHONE ENCOUNTER
Noted...  Pt phoned and notified of lab orders. Stated will get done on tomorrow (Friday) morning.

## 2023-09-21 NOTE — TELEPHONE ENCOUNTER
----- Message from Lucila Carrasquillo sent at 9/21/2023 10:53 AM CDT -----  .Caller is requesting to schedule their Lab appointment prior to annual appointment.  Order is not listed in EPIC.  Please enter order and contact patient to schedule.    Name of Caller: pt    Preferred Date and Time of Labs: now      Date of EPP Appointment: 9-25-23    Where would they like the lab performed? [facility]    Would the patient rather a call back? yes      Best Call Back Number: [phone]    Additional Information: please put labs in pt is going do labs tomorrow 9- 22-23 for his appt on 9-25-23

## 2023-09-22 ENCOUNTER — LAB VISIT (OUTPATIENT)
Dept: LAB | Facility: HOSPITAL | Age: 83
End: 2023-09-22
Attending: NURSE PRACTITIONER
Payer: MEDICARE

## 2023-09-22 DIAGNOSIS — E78.5 HYPERLIPIDEMIA, UNSPECIFIED HYPERLIPIDEMIA TYPE: ICD-10-CM

## 2023-09-22 DIAGNOSIS — R79.9 ABNORMAL BLOOD CHEMISTRY: ICD-10-CM

## 2023-09-22 DIAGNOSIS — Z00.00 WELLNESS EXAMINATION: ICD-10-CM

## 2023-09-22 LAB
ALBUMIN SERPL-MCNC: 3.8 G/DL (ref 3.4–4.8)
ALBUMIN/GLOB SERPL: 1.6 RATIO (ref 1.1–2)
ALP SERPL-CCNC: 79 UNIT/L (ref 40–150)
ALT SERPL-CCNC: 14 UNIT/L (ref 0–55)
APPEARANCE UR: CLEAR
AST SERPL-CCNC: 14 UNIT/L (ref 5–34)
BASOPHILS # BLD AUTO: 0.04 X10(3)/MCL
BASOPHILS NFR BLD AUTO: 0.8 %
BILIRUB SERPL-MCNC: 0.6 MG/DL
BILIRUB UR QL STRIP.AUTO: NEGATIVE
BUN SERPL-MCNC: 26.1 MG/DL (ref 8.4–25.7)
CALCIUM SERPL-MCNC: 9.6 MG/DL (ref 8.8–10)
CHLORIDE SERPL-SCNC: 108 MMOL/L (ref 98–107)
CHOLEST SERPL-MCNC: 120 MG/DL
CHOLEST/HDLC SERPL: 3 {RATIO} (ref 0–5)
CO2 SERPL-SCNC: 28 MMOL/L (ref 23–31)
COLOR UR: YELLOW
CREAT SERPL-MCNC: 0.88 MG/DL (ref 0.73–1.18)
EOSINOPHIL # BLD AUTO: 0.37 X10(3)/MCL (ref 0–0.9)
EOSINOPHIL NFR BLD AUTO: 7 %
ERYTHROCYTE [DISTWIDTH] IN BLOOD BY AUTOMATED COUNT: 13.4 % (ref 11.5–17)
EST. AVERAGE GLUCOSE BLD GHB EST-MCNC: 99.7 MG/DL
GFR SERPLBLD CREATININE-BSD FMLA CKD-EPI: >60 MLS/MIN/1.73/M2
GLOBULIN SER-MCNC: 2.4 GM/DL (ref 2.4–3.5)
GLUCOSE SERPL-MCNC: 98 MG/DL (ref 82–115)
GLUCOSE UR QL STRIP.AUTO: NEGATIVE
HBA1C MFR BLD: 5.1 %
HCT VFR BLD AUTO: 34.6 % (ref 42–52)
HDLC SERPL-MCNC: 41 MG/DL (ref 35–60)
HGB BLD-MCNC: 11.6 G/DL (ref 14–18)
IMM GRANULOCYTES # BLD AUTO: 0.01 X10(3)/MCL (ref 0–0.04)
IMM GRANULOCYTES NFR BLD AUTO: 0.2 %
KETONES UR QL STRIP.AUTO: NEGATIVE
LDLC SERPL CALC-MCNC: 65 MG/DL (ref 50–140)
LEUKOCYTE ESTERASE UR QL STRIP.AUTO: NEGATIVE
LYMPHOCYTES # BLD AUTO: 1.05 X10(3)/MCL (ref 0.6–4.6)
LYMPHOCYTES NFR BLD AUTO: 19.9 %
MCH RBC QN AUTO: 31 PG (ref 27–31)
MCHC RBC AUTO-ENTMCNC: 33.5 G/DL (ref 33–36)
MCV RBC AUTO: 92.5 FL (ref 80–94)
MONOCYTES # BLD AUTO: 0.41 X10(3)/MCL (ref 0.1–1.3)
MONOCYTES NFR BLD AUTO: 7.8 %
NEUTROPHILS # BLD AUTO: 3.4 X10(3)/MCL (ref 2.1–9.2)
NEUTROPHILS NFR BLD AUTO: 64.3 %
NITRITE UR QL STRIP.AUTO: NEGATIVE
NRBC BLD AUTO-RTO: 0 %
PH UR STRIP.AUTO: 7 [PH]
PLATELET # BLD AUTO: 172 X10(3)/MCL (ref 130–400)
PMV BLD AUTO: 9.9 FL (ref 7.4–10.4)
POTASSIUM SERPL-SCNC: 3.9 MMOL/L (ref 3.5–5.1)
PROT SERPL-MCNC: 6.2 GM/DL (ref 5.8–7.6)
PROT UR QL STRIP.AUTO: NEGATIVE
RBC # BLD AUTO: 3.74 X10(6)/MCL (ref 4.7–6.1)
RBC UR QL AUTO: NEGATIVE
SODIUM SERPL-SCNC: 146 MMOL/L (ref 136–145)
SP GR UR STRIP.AUTO: 1.01 (ref 1–1.03)
TRIGL SERPL-MCNC: 69 MG/DL (ref 34–140)
TSH SERPL-ACNC: 2.87 UIU/ML (ref 0.35–4.94)
UROBILINOGEN UR STRIP-ACNC: 2
VLDLC SERPL CALC-MCNC: 14 MG/DL
WBC # SPEC AUTO: 5.28 X10(3)/MCL (ref 4.5–11.5)

## 2023-09-22 PROCEDURE — 85025 COMPLETE CBC W/AUTO DIFF WBC: CPT

## 2023-09-22 PROCEDURE — 83036 HEMOGLOBIN GLYCOSYLATED A1C: CPT

## 2023-09-22 PROCEDURE — 80053 COMPREHEN METABOLIC PANEL: CPT

## 2023-09-22 PROCEDURE — 84443 ASSAY THYROID STIM HORMONE: CPT

## 2023-09-22 PROCEDURE — 80061 LIPID PANEL: CPT

## 2023-09-22 PROCEDURE — 36415 COLL VENOUS BLD VENIPUNCTURE: CPT

## 2023-09-25 ENCOUNTER — OFFICE VISIT (OUTPATIENT)
Dept: FAMILY MEDICINE | Facility: CLINIC | Age: 83
End: 2023-09-25
Payer: MEDICARE

## 2023-09-25 VITALS
HEART RATE: 77 BPM | SYSTOLIC BLOOD PRESSURE: 134 MMHG | RESPIRATION RATE: 20 BRPM | TEMPERATURE: 98 F | WEIGHT: 181.31 LBS | DIASTOLIC BLOOD PRESSURE: 70 MMHG | BODY MASS INDEX: 26.85 KG/M2 | HEIGHT: 69 IN | OXYGEN SATURATION: 98 %

## 2023-09-25 DIAGNOSIS — E03.8 OTHER SPECIFIED HYPOTHYROIDISM: ICD-10-CM

## 2023-09-25 DIAGNOSIS — Z87.898 HISTORY OF ELEVATED PSA: ICD-10-CM

## 2023-09-25 DIAGNOSIS — E78.2 MIXED HYPERLIPIDEMIA: ICD-10-CM

## 2023-09-25 DIAGNOSIS — G47.33 OSA ON CPAP: ICD-10-CM

## 2023-09-25 DIAGNOSIS — I10 PRIMARY HYPERTENSION: ICD-10-CM

## 2023-09-25 DIAGNOSIS — Z86.79 HISTORY OF ATRIAL FIBRILLATION: ICD-10-CM

## 2023-09-25 DIAGNOSIS — Z00.00 WELLNESS EXAMINATION: Primary | ICD-10-CM

## 2023-09-25 PROBLEM — E87.0 HYPERNATREMIA: Status: ACTIVE | Noted: 2023-09-25

## 2023-09-25 PROCEDURE — G0439 PPPS, SUBSEQ VISIT: HCPCS | Mod: ,,, | Performed by: NURSE PRACTITIONER

## 2023-09-25 PROCEDURE — G0439 PR MEDICARE ANNUAL WELLNESS SUBSEQUENT VISIT: ICD-10-PCS | Mod: ,,, | Performed by: NURSE PRACTITIONER

## 2023-09-25 RX ORDER — LISINOPRIL 20 MG/1
40 TABLET ORAL 2 TIMES DAILY
Qty: 180 TABLET | Refills: 1
Start: 2023-09-25 | End: 2024-02-26 | Stop reason: SDUPTHER

## 2023-09-25 NOTE — PROGRESS NOTES
Patient ID: [id number]     Chief Complaint: Medicare Wellness      HPI:     Eloy Patel is a 83 y.o. male here today for a Medicare Wellness.   Denies any acute problems    Labs previously done and reviewed with patient  Colon cancer screening: no longer recommended  Pneumovax up to date  Influenza up to date  Covid 19 vaccine up to date        Opioid Screening: Patient medication list reviewed, patient is not taking prescription opioids. Patient is not using additional opioids than prescribed. Patient is at low risk of substance abuse based on this opioid use history.       ----------------------------  Anemia, unspecified  CAD (coronary artery disease)  CHF (congestive heart failure)  History of elevated PSA  Hyperlipidemia  Hypothyroidism  Mobility impaired  WOLF on CPAP  Osteoarthritis of right hip  Paroxysmal atrial fibrillation  Pleural effusion, bilateral  Primary hypertension     Past Surgical History:   Procedure Laterality Date    AORTIC VALVE REPLACEMENT  2023    CATARACT EXTRACTION Left 2016    CHOLECYSTECTOMY  2003    COLONOSCOPY  06/24/2019    COLONOSCOPY  2008    CORONARY ARTERY BYPASS GRAFT  2023    excision right interior neck  02/07/2019    HERNIA REPAIR      MOUTH SURGERY      TONSILLECTOMY         Review of patient's allergies indicates:   Allergen Reactions    Sulfamethoxazole-trimethoprim      Other reaction(s): vomiting    Sulfa (sulfonamide antibiotics) Rash and Itching       Outpatient Medications Marked as Taking for the 9/25/23 encounter (Office Visit) with Carlee Wolf NP   Medication Sig Dispense Refill    ADULT LOW DOSE ASPIRIN 81 mg EC tablet Take 81 mg by mouth.      atorvastatin (LIPITOR) 40 MG tablet Take 1 tablet (40 mg total) by mouth every evening. 90 tablet 3    docusate sodium (COLACE) 100 MG capsule Take 1 capsule by mouth every evening.      furosemide (LASIX) 40 MG tablet Take 40 mg by mouth 2 (two) times daily.      levothyroxine (SYNTHROID) 50 MCG tablet Take 1  tablet (50 mcg total) by mouth once daily. 90 tablet 3    multivit-min-FA-lycopen-lutein 300-600-300 mcg Tab Take 1 tablet by mouth once daily.      potassium chloride SA (K-DUR,KLOR-CON) 20 MEQ tablet Take 20 mEq by mouth.      tamsulosin (FLOMAX) 0.4 mg Cap Take 0.4 mg by mouth once daily at 6am.      TYLENOL 325 mg tablet Take by mouth as needed.      [DISCONTINUED] lisinopriL (PRINIVIL,ZESTRIL) 20 MG tablet TAKE 1 TABLET BY MOUTH TWICE A  tablet 1       Social History     Socioeconomic History    Marital status:    Occupational History    Occupation: retired   Tobacco Use    Smoking status: Former     Types: Cigarettes     Passive exposure: Never    Smokeless tobacco: Never   Substance and Sexual Activity    Alcohol use: Yes     Comment: occassionally    Drug use: Never        History reviewed. No pertinent family history.     Patient Care Team:  Dana Corrales MD as PCP - General (Family Medicine)  Dana Corrales MD Leleux, Jon D., MD as Consulting Physician (Cardiology)  Jaquan Yu MD as Consulting Physician (Cardiothoracic Surgery)  Nicanor Canseco MD (Dermatology)  Onofre Rivera MD as Consulting Physician (Gastroenterology)  Leonard Talamantes III, MD as Consulting Physician (General Surgery)  Jose Manuel Daniels MD as Consulting Physician (Urology)  Nadja Cortes MD (Pulmonary Disease)       Subjective:     Review of Systems   All other systems reviewed and are negative.        Patient Reported Health Risk Assessment  What is your age?: 80 or older  Are you male or female?: Male  During the past four weeks, how much have you been bothered by emotional problems such as feeling anxious, depressed, irritable, sad, or downhearted and blue?: Not at all  During the past five weeks, has your physical and/or emotional health limited your social activities with family, friends, neighbors, or groups?: Slightly  During the past four weeks, how much bodily pain have you generally  had?: Mild pain  During the past four weeks, was someone available to help if you needed and wanted help?: Yes, as much as I wanted  During the past four weeks, what was the hardest physical activity you could do for at least two minutes?: Very light  Can you get to places out of walking distance without help?  (For example, can you travel alone on buses or taxis, or drive your own car?): No  Can you go shopping for groceries or clothes without someone's help?: No  Can you prepare your own meals?: Yes  Can you do your own housework without help?: No  Because of any health problems, do you need the help of another person with your personal care needs such as eating, bathing, dressing, or getting around the house?: Yes  Can you handle your own money without help?: Yes  During the past four weeks, how would you rate your health in general?: Fair  How have things been going for you during the past four weeks?: Pretty well  Are you having difficulties driving your car?: Not applicable, I do not use a car  Do you always fasten your seat belt when you are in a car?: Yes, usually  How often in the past four weeks have you been bothered by falling or dizzy when standing up?: Never  How often in the past four weeks have you been bothered by sexual problems?: Sometimes  How often in the past four weeks have you been bothered by trouble eating well?: Seldom  How often in the past four weeks have you been bothered by teeth or denture problems?: Seldom  How often in the past four weeks have you been bothered with problems using the telephone?: Often  How often in the past four weeks have you been bothered by tiredness or fatigue?: Seldom  Have you fallen two or more times in the past year?: No  Are you afraid of falling?: No  Are you a smoker?: No  During the past four weeks, how many drinks of wine, beer, or other alcoholic beverages did you have?: One drink or less per week  Do you exercise for about 20 minutes three or more  "days a week?: No, I usually do not exercise this much    Objective:     /70 (BP Location: Left arm, Patient Position: Sitting, BP Method: Medium (Manual))   Pulse 77   Temp 98.2 °F (36.8 °C) (Oral)   Resp 20   Ht 5' 9" (1.753 m)   Wt 82.2 kg (181 lb 4.8 oz)   SpO2 98%   BMI 26.77 kg/m²     Physical Exam  Vitals reviewed.   Constitutional:       Appearance: He is not toxic-appearing.   HENT:      Head: Normocephalic.      Mouth/Throat:      Mouth: Mucous membranes are moist.   Eyes:      Extraocular Movements: Extraocular movements intact.      Pupils: Pupils are equal, round, and reactive to light.   Cardiovascular:      Rate and Rhythm: Normal rate and regular rhythm.      Pulses: Normal pulses.      Heart sounds: Normal heart sounds.   Pulmonary:      Effort: Pulmonary effort is normal. No respiratory distress.      Breath sounds: Normal breath sounds.   Abdominal:      General: Bowel sounds are normal. There is no distension.      Palpations: Abdomen is soft.      Tenderness: There is no abdominal tenderness.   Musculoskeletal:         General: No tenderness. Normal range of motion.      Cervical back: Neck supple.   Skin:     General: Skin is warm and dry.   Neurological:      Mental Status: He is alert and oriented to person, place, and time.   Psychiatric:         Mood and Affect: Mood normal.                No data to display                  9/25/2023    10:00 AM 9/14/2023     3:30 PM 4/3/2023     9:40 AM 1/3/2023    11:45 AM 8/31/2022     9:00 AM   Fall Risk Assessment - Outpatient   Mobility Status Ambulatory Ambulatory Ambulatory w/ assistance Ambulatory w/ assistance Ambulatory w/ assistance   Number of falls 0 0 0 0 0   Identified as fall risk False False True True False           Depression Screening  Over the past two weeks, has the patient felt down, depressed, or hopeless?: No  Over the past two weeks, has the patient felt little interest or pleasure in doing things?: No  Functional " Ability/Safety Screening  Was the patient's timed Up & Go test unsteady or longer than 30 seconds?: Yes  Does the patient need help with phone, transportation, shopping, preparing meals, housework, laundry, meds, or managing money?: No  Does the patient's home have rugs in the hallway, lack grab bars in the bathroom, lack handrails on the stairs or have poor lighting?: No  Have you noticed any hearing difficulties?: No  Cognitive Function (Assessed through direct observation with due consideration of information obtained by way of patient reports and/or concerns raised by family, friends, caretakers, or others)    Does the patient repeat questions/statements in the same day?: No  Does the patient have trouble remembering the date, year, and time?: No  Does the patient have difficulty managing finances?: No  Does the patient have a decreased sense of direction?: No  Assessment/Plan:     1. Wellness examination  Assessment & Plan:  Labs previously done and reviewed with patient  Colon cancer screening: no longer recommended  Pneumovax up to date  Influenza up to date  Covid 19 vaccine up to date      2. Primary hypertension  Assessment & Plan:  BP at goal  Continue current medication  Daily BP check; bring log all clinic visits  Instructed to report to ED for any BP greater than 200/100, dizziness, syncope, CP, or SOB  Keep appt. With PCP for follow up  Patient is agreeable to plan and verbalizes understanding          3. Mixed hyperlipidemia  Assessment & Plan:  Stable  Continue Statin  Keep appt with PCP for follow up      4. WOLF on CPAP  Assessment & Plan:  Stable  Continue C-PAP      5. Other specified hypothyroidism  Assessment & Plan:  Stable  Continue current medication      6. History of elevated PSA  Assessment & Plan:  Following Dr. Daniels, Urology  Keep appt for follow up      7. History of atrial fibrillation  Assessment & Plan:  Stable  Keep appt with Cardiology for follow up      Other orders  -      lisinopriL (PRINIVIL,ZESTRIL) 20 MG tablet; Take 2 tablets (40 mg total) by mouth 2 (two) times daily.  Dispense: 180 tablet; Refill: 1           Medicare Annual Wellness and Personalized Prevention Plan:   Fall Risk + Home Safety + Hearing Impairment + Depression Screen + Opioid and Substance Abuse Screening + Cognitive Impairment Screen + Health Risk Assessment all reviewed.     Health Maintenance Topics with due status: Not Due       Topic Last Completion Date    Lipid Panel 09/22/2023      The patient's Health Maintenance was reviewed and the following appears to be due at this time:   There are no preventive care reminders to display for this patient.      Advance Care Planning   I attest to discussing Advance Care Planning with patient and/or family member.  Education was provided including the importance of the Health Care Power of , Advance Directives, and/or [place] documentation.  The patient expressed understanding to the importance of this information and discussion.         Follow up in about 6 months (around 3/25/2024) for F/U with PCP. In addition to their scheduled follow up, the patient has also been instructed to follow up on as needed basis.

## 2023-09-25 NOTE — ASSESSMENT & PLAN NOTE
Labs previously done and reviewed with patient  Colon cancer screening: no longer recommended  Pneumovax up to date  Influenza up to date  Covid 19 vaccine up to date

## 2023-09-26 ENCOUNTER — DOCUMENT SCAN (OUTPATIENT)
Dept: HOME HEALTH SERVICES | Facility: HOSPITAL | Age: 83
End: 2023-09-26
Payer: MEDICARE

## 2023-10-12 RX ORDER — LEVOTHYROXINE SODIUM 50 UG/1
50 TABLET ORAL
Qty: 90 TABLET | Refills: 3 | Status: SHIPPED | OUTPATIENT
Start: 2023-10-12

## 2023-10-18 ENCOUNTER — OFFICE VISIT (OUTPATIENT)
Dept: FAMILY MEDICINE | Facility: CLINIC | Age: 83
End: 2023-10-18
Payer: MEDICARE

## 2023-10-18 ENCOUNTER — TELEPHONE (OUTPATIENT)
Dept: FAMILY MEDICINE | Facility: CLINIC | Age: 83
End: 2023-10-18

## 2023-10-18 VITALS
TEMPERATURE: 98 F | BODY MASS INDEX: 26.46 KG/M2 | HEART RATE: 71 BPM | DIASTOLIC BLOOD PRESSURE: 64 MMHG | SYSTOLIC BLOOD PRESSURE: 135 MMHG | HEIGHT: 69 IN | WEIGHT: 178.63 LBS | OXYGEN SATURATION: 96 % | RESPIRATION RATE: 19 BRPM

## 2023-10-18 DIAGNOSIS — Z01.818 PRE-OP EXAM: Primary | ICD-10-CM

## 2023-10-18 PROCEDURE — 99214 PR OFFICE/OUTPT VISIT, EST, LEVL IV, 30-39 MIN: ICD-10-PCS | Mod: ,,, | Performed by: NURSE PRACTITIONER

## 2023-10-18 PROCEDURE — 99214 OFFICE O/P EST MOD 30 MIN: CPT | Mod: ,,, | Performed by: NURSE PRACTITIONER

## 2023-10-18 NOTE — ASSESSMENT & PLAN NOTE
EKG performed today: No  Preoperative Labs ordered: N/A (previously done per pre-op; will request results)  Medications adjusted: Hold ASA 1 week prior to surgery    Pt has had Cardiac clearance per Cardiologist  Will medically clear after I obtain and review pre-op labs  Verbalizes understanding

## 2023-10-18 NOTE — PROGRESS NOTES
Subjective:      Patient ID: Eloy Patel is a 83 y.o. White male      Chief Complaint: Sx Clearance       Past Medical History:   Diagnosis Date    Anemia, unspecified     CAD (coronary artery disease)     CHF (congestive heart failure)     History of elevated PSA 8/31/2022    Hyperlipidemia 8/31/2022    Hypothyroidism     Mobility impaired     WOLF on CPAP     Osteoarthritis of right hip     Paroxysmal atrial fibrillation     Pleural effusion, bilateral 4/3/2023    Primary hypertension 8/31/2022        HPI  Presents to clinic for surgery clearance    Surgery: Rt hip  Surgeon: LOS  Date: 11/2/20223  Anesthesia type:  Allergies to medications: Review of patient's allergies indicates:   -- Sulfamethoxazole-trimethoprim     --  Other reaction(s): vomiting   -- Sulfa (sulfonamide antibiotics) -- Rash and Itching    Previous exposure to anesthesia: yes  Complications secondary to anesthesia:  no       Preoperative Evaluation:  Step 1: Emergency Surgery? No  Step 2: Has coronary revascularization been done in the past 5 years? No  Step 3: Has coronary angiography or stress test been done in past 2 years? No  Step 4: Evaluate clinical predictors:  No symptoms of stable or unstable angina, no history of arrhythmias, + History of severe valvular heart disease (Hx Aortic Valve Replacement), no history of uncontrolled hypertension, no history of abnormal EKG, no history of prior myocardial infarction, no history to suggest congestive heart failure, no history of prior CHF, no history of diabetes or renal insufficiency       EKG performed today: n/a; pt cleared per Cardiologist  Preoperative Labs ordered:   Medications adjusted:                Patient Care Team:  Dana Corrales MD as PCP - General (Family Medicine)  Dana Corrales MD Leleux, Jon D., MD as Consulting Physician (Cardiology)  Jaquan Yu MD as Consulting Physician (Cardiothoracic Surgery)  Nicanor Canseco MD (Dermatology)  Onofre Rivera  MD YOVANI as Consulting Physician (Gastroenterology)  Leonard Talamantes III, MD as Consulting Physician (General Surgery)  Jose Manuel Daniels MD as Consulting Physician (Urology)  Nadja Cortes MD (Pulmonary Disease)      Review of Systems   Constitutional:  Negative for chills, fatigue, fever and unexpected weight change.   HENT: Negative.     Eyes: Negative.    Respiratory: Negative.  Negative for shortness of breath.    Cardiovascular: Negative.  Negative for chest pain.   Gastrointestinal: Negative.    Endocrine: Negative.    Genitourinary: Negative.    Musculoskeletal: Negative.    Integumentary:  Negative.   Allergic/Immunologic: Negative.    Neurological: Negative.  Negative for weakness.   Hematological: Negative.    Psychiatric/Behavioral: Negative.     All other systems reviewed and are negative.          Objective:      Vitals:    10/18/23 1105   BP: 135/64   Pulse: 71   Resp: 19   Temp: 97.5 °F (36.4 °C)      Body mass index is 26.37 kg/m².     Physical Exam  Vitals reviewed.   Constitutional:       Appearance: He is not toxic-appearing.   HENT:      Head: Normocephalic.      Mouth/Throat:      Mouth: Mucous membranes are moist.   Eyes:      Extraocular Movements: Extraocular movements intact.      Pupils: Pupils are equal, round, and reactive to light.   Cardiovascular:      Rate and Rhythm: Normal rate and regular rhythm.      Pulses: Normal pulses.      Heart sounds: Normal heart sounds.   Pulmonary:      Effort: Pulmonary effort is normal. No respiratory distress.      Breath sounds: Normal breath sounds.   Abdominal:      General: Bowel sounds are normal. There is no distension.      Palpations: Abdomen is soft.      Tenderness: There is no abdominal tenderness.   Musculoskeletal:         General: No tenderness. Normal range of motion.      Cervical back: Neck supple.   Skin:     General: Skin is warm and dry.   Neurological:      Mental Status: He is alert and oriented to person, place, and time.    Psychiatric:         Mood and Affect: Mood normal.            Current Outpatient Medications:     ADULT LOW DOSE ASPIRIN 81 mg EC tablet, Take 81 mg by mouth., Disp: , Rfl:     albuterol-ipratropium (DUO-NEB) 2.5 mg-0.5 mg/3 mL nebulizer solution, Take by nebulization., Disp: , Rfl:     atorvastatin (LIPITOR) 40 MG tablet, Take 1 tablet (40 mg total) by mouth every evening., Disp: 90 tablet, Rfl: 3    docusate sodium (COLACE) 100 MG capsule, Take 1 capsule by mouth every evening., Disp: , Rfl:     furosemide (LASIX) 40 MG tablet, Take 40 mg by mouth 2 (two) times daily., Disp: , Rfl:     levothyroxine (SYNTHROID) 50 MCG tablet, TAKE 1 TABLET BY MOUTH EVERY DAY, Disp: 90 tablet, Rfl: 3    lisinopriL (PRINIVIL,ZESTRIL) 20 MG tablet, Take 2 tablets (40 mg total) by mouth 2 (two) times daily., Disp: 180 tablet, Rfl: 1    multivit-min-FA-lycopen-lutein 300-600-300 mcg Tab, Take 1 tablet by mouth once daily., Disp: , Rfl:     potassium chloride SA (K-DUR,KLOR-CON) 20 MEQ tablet, Take 20 mEq by mouth., Disp: , Rfl:     tamsulosin (FLOMAX) 0.4 mg Cap, Take 0.4 mg by mouth once daily at 6am., Disp: , Rfl:     TYLENOL 325 mg tablet, Take by mouth as needed., Disp: , Rfl:     Assessment & Plan:     Problem List Items Addressed This Visit          Other    Pre-op exam - Primary     EKG performed today: No  Preoperative Labs ordered: N/A (previously done per pre-op; will request results)  Medications adjusted: Hold ASA 1 week prior to surgery    Pt has had Cardiac clearance per Cardiologist  Will medically clear after I obtain and review pre-op labs  Verbalizes understanding               Prior to the patient's arrival on the same day, I spent (5) minutes reviewing chart. Once in the exam room with the patient, I spent (20 ) minutes in the room with the member performing a history and exam as well as reviewing the test results and recommendations with the patient. After leaving the exam room, I spent an additional (5 ) minutes  completing the electronic health record. The total time spent that day caring for the member is (30 ) minutes, and this time - including the breakdown - is documented in the medical record.

## 2023-11-08 ENCOUNTER — TELEPHONE (OUTPATIENT)
Dept: FAMILY MEDICINE | Facility: CLINIC | Age: 83
End: 2023-11-08
Payer: MEDICARE

## 2023-11-08 NOTE — TELEPHONE ENCOUNTER
----- Message from Sis Acevedo sent at 11/8/2023 12:54 PM CST -----  Regarding: office notes needed  Type:  Needs Medical Advice    Who Called: Cait @ [facility]    Best Call Back Number: [phone]    fax# [phone]    Additional Information: latest office notes for pt needs to faxed to vanita  Due to ortho referral

## 2023-12-18 PROBLEM — Z09 HOSPITAL DISCHARGE FOLLOW-UP: Status: RESOLVED | Noted: 2023-04-03 | Resolved: 2023-12-18

## 2023-12-25 PROBLEM — Z00.00 WELLNESS EXAMINATION: Status: RESOLVED | Noted: 2023-09-25 | Resolved: 2023-12-25

## 2024-02-26 ENCOUNTER — TELEPHONE (OUTPATIENT)
Dept: FAMILY MEDICINE | Facility: CLINIC | Age: 84
End: 2024-02-26
Payer: MEDICARE

## 2024-02-26 RX ORDER — LISINOPRIL 20 MG/1
40 TABLET ORAL 2 TIMES DAILY
Qty: 180 TABLET | Refills: 1
Start: 2024-02-26 | End: 2024-02-26 | Stop reason: SDUPTHER

## 2024-02-26 RX ORDER — LISINOPRIL 20 MG/1
40 TABLET ORAL DAILY
COMMUNITY

## 2024-02-26 NOTE — TELEPHONE ENCOUNTER
Pharmacy needs clarification on dosage of Lisinopril:   2 tablets (40 mg total) by mouth 2 (two) times daily; pharmacy states that an exceeding amount from previous RX.

## 2024-02-26 NOTE — TELEPHONE ENCOUNTER
----- Message from Rosemarie Calabrese sent at 2/26/2024 11:35 AM CST -----  Regarding: refill  Type:  RX Refill Request    Who Called: Eloy    Refill or New Rx:refill    RX Name and Strength:lisinopriL (PRINIVIL,ZESTRIL) 20 MG tablet    How is the patient currently taking it? (ex. 1XDay):2 a day    Is this a 30 day or 90 day RX:91 day supply    Preferred Pharmacy with phone number:[facility]    Local or Mail Order:    Ordering Provider:    Would the patient rather a call back or a response via [facility]?     Best Call Back Number:    Additional Information:

## 2024-02-26 NOTE — TELEPHONE ENCOUNTER
Call pharmacy with verbal clarification of Lisinopril.   [facility] - [phone]

## 2024-03-04 RX ORDER — FUROSEMIDE 40 MG/1
40 TABLET ORAL 2 TIMES DAILY
Qty: 180 TABLET | Refills: 0 | Status: SHIPPED | OUTPATIENT
Start: 2024-03-04 | End: 2024-03-25

## 2024-03-04 NOTE — TELEPHONE ENCOUNTER
----- Message from Gregoria Cramer sent at 3/4/2024  4:06 PM CST -----  Regarding: refill request  Type:  RX Refill Request    Who Called:  pt    Refill or New Rx: refill    RX Name and Strength: furosemide (LASIX) 40 MG tablet    How is the patient currently taking it? (ex. 1XDay): one day    Is this a 30 day or 90 day RX: 30    [facility]    Local or Mail Order: local    Ordering Provider: joslyn    Would the patient rather a call back or a response via [facility]?  Yes if needed    Best Call Back Number: [phone]    Additional Information:  pt  called to request refill, pt says pharmacy needs PA to refill, please advise, thanks

## 2024-03-25 ENCOUNTER — OFFICE VISIT (OUTPATIENT)
Dept: FAMILY MEDICINE | Facility: CLINIC | Age: 84
End: 2024-03-25
Payer: MEDICARE

## 2024-03-25 ENCOUNTER — HOSPITAL ENCOUNTER (OUTPATIENT)
Dept: RADIOLOGY | Facility: HOSPITAL | Age: 84
Discharge: HOME | End: 2024-03-25
Attending: NURSE PRACTITIONER
Payer: MEDICARE

## 2024-03-25 VITALS
RESPIRATION RATE: 19 BRPM | TEMPERATURE: 97 F | DIASTOLIC BLOOD PRESSURE: 86 MMHG | SYSTOLIC BLOOD PRESSURE: 170 MMHG | HEIGHT: 69 IN | BODY MASS INDEX: 27.01 KG/M2 | OXYGEN SATURATION: 94 % | HEART RATE: 90 BPM | WEIGHT: 182.38 LBS

## 2024-03-25 DIAGNOSIS — M25.551 RIGHT HIP PAIN: ICD-10-CM

## 2024-03-25 DIAGNOSIS — S70.01XA HEMATOMA OF RIGHT HIP, INITIAL ENCOUNTER: ICD-10-CM

## 2024-03-25 DIAGNOSIS — I10 PRIMARY HYPERTENSION: Primary | ICD-10-CM

## 2024-03-25 PROCEDURE — 73502 X-RAY EXAM HIP UNI 2-3 VIEWS: CPT | Mod: TC,RT

## 2024-03-25 PROCEDURE — 99214 OFFICE O/P EST MOD 30 MIN: CPT | Mod: ,,, | Performed by: NURSE PRACTITIONER

## 2024-03-25 RX ORDER — FUROSEMIDE 40 MG/1
40 TABLET ORAL DAILY
Qty: 180 TABLET | Refills: 0
Start: 2024-03-25

## 2024-03-25 NOTE — PROGRESS NOTES
"Subjective:      Patient ID: Eloy Patel is a 84 y.o. White male      Chief Complaint: Hospital F/U       Past Medical History:   Diagnosis Date    Anemia, unspecified     CAD (coronary artery disease)     CHF (congestive heart failure)     History of elevated PSA 2022    Hyperlipidemia 2022    Hypothyroidism     Mobility impaired     WOLF on CPAP     Osteoarthritis of right hip     Paroxysmal atrial fibrillation     Pleural effusion, bilateral 4/3/2023    Primary hypertension 2022        HPI  Presents to clinic for follow up.      HTN/CHF:  BP elevated today. Curently taking Lisinopril 20mg po (two tablets) daily and Lasix 40 mg po daily (with K+ supplement).  Took Lasix 10 minutes prior to appt.  Did not take Lasix.  States BP normal at home.  Denies any headaches, weakness, dizziness, CP, or SOB. Denies any other problems.       C/O of right hip pain x 4 days.  States he fell onto right hip due to weakness after "driving" 500 miles for a .  Associated with bruising and a hard "knot" of right hip.  Of note, pt has history of right QUANG per Dr Thorne.  Denies any other problems.          Patient Care Team:  Dana Corrales MD as PCP - General (Family Medicine)  Dana Corrales MD Leleux, Jon D., MD as Consulting Physician (Cardiology)  Jaquan Yu MD as Consulting Physician (Cardiothoracic Surgery)  Nicanor Canseco MD (Dermatology)  Onofre Rivera MD as Consulting Physician (Gastroenterology)  Leonard Talamantes III, MD as Consulting Physician (General Surgery)  Jose Manuel Daniels MD as Consulting Physician (Urology)  Nadja Cortes MD (Pulmonary Disease)      Review of Systems   Constitutional:  Negative for chills, fatigue, fever and unexpected weight change.   HENT: Negative.     Eyes: Negative.    Respiratory: Negative.  Negative for shortness of breath.    Cardiovascular: Negative.  Negative for chest pain.   Gastrointestinal: Negative.    Endocrine: Negative.  "   Genitourinary: Negative.    Musculoskeletal: Negative.    Integumentary:  Negative.   Allergic/Immunologic: Negative.    Neurological: Negative.  Negative for weakness.   Hematological: Negative.    Psychiatric/Behavioral: Negative.     All other systems reviewed and are negative.          Objective:      Vitals:    03/25/24 1250   BP: (!) 170/86   Pulse:    Resp:    Temp:       Body mass index is 26.94 kg/m².     Physical Exam  Vitals reviewed.   Constitutional:       Appearance: He is not toxic-appearing.   HENT:      Head: Normocephalic.      Mouth/Throat:      Mouth: Mucous membranes are moist.   Eyes:      Extraocular Movements: Extraocular movements intact.      Pupils: Pupils are equal, round, and reactive to light.   Cardiovascular:      Rate and Rhythm: Normal rate and regular rhythm.      Pulses: Normal pulses.      Heart sounds: Normal heart sounds.   Pulmonary:      Effort: Pulmonary effort is normal. No respiratory distress.      Breath sounds: Normal breath sounds.   Abdominal:      General: Bowel sounds are normal. There is no distension.      Palpations: Abdomen is soft.      Tenderness: There is no abdominal tenderness.   Musculoskeletal:         General: No tenderness. Normal range of motion.      Cervical back: Neck supple.      Comments: Right hip hematoma; normal skin temperature; TTP   Skin:     General: Skin is warm and dry.   Neurological:      Mental Status: He is alert and oriented to person, place, and time.   Psychiatric:         Mood and Affect: Mood normal.            Current Outpatient Medications:     ADULT LOW DOSE ASPIRIN 81 mg EC tablet, Take 81 mg by mouth., Disp: , Rfl:     albuterol-ipratropium (DUO-NEB) 2.5 mg-0.5 mg/3 mL nebulizer solution, Take by nebulization., Disp: , Rfl:     atorvastatin (LIPITOR) 40 MG tablet, Take 1 tablet (40 mg total) by mouth every evening., Disp: 90 tablet, Rfl: 3    docusate sodium (COLACE) 100 MG capsule, Take 1 capsule by mouth every evening.,  Disp: , Rfl:     furosemide (LASIX) 40 MG tablet, Take 1 tablet (40 mg total) by mouth once daily., Disp: 180 tablet, Rfl: 0    levothyroxine (SYNTHROID) 50 MCG tablet, TAKE 1 TABLET BY MOUTH EVERY DAY, Disp: 90 tablet, Rfl: 3    lisinopriL (PRINIVIL,ZESTRIL) 20 MG tablet, Take 40 mg by mouth once daily. Take 2 tablet by mouth twice daily (40 mg total), Disp: , Rfl:     multivit-min-FA-lycopen-lutein 300-600-300 mcg Tab, Take 1 tablet by mouth once daily., Disp: , Rfl:     potassium chloride SA (K-DUR,KLOR-CON) 20 MEQ tablet, Take 20 mEq by mouth., Disp: , Rfl:     tamsulosin (FLOMAX) 0.4 mg Cap, Take 0.4 mg by mouth once daily at 6am., Disp: , Rfl:     TYLENOL 325 mg tablet, Take by mouth as needed., Disp: , Rfl:     Assessment & Plan:     Problem List Items Addressed This Visit          Cardiac/Vascular    Primary hypertension - Primary     BP elevated; Asymptomatic  Took meds few minutes prior to appt; did not take Lasix today  Curently taking Lisinopril 20mg po (two tablets) daily and Lasix 40 mg po daily (with K+ supplement)   Continue current meds  Instructed to take BP meds prior to all clinic appointments  Instructed to report to ED for any BP >200/100, SOB, CP, and/or worsening symptoms  Daily BP check; bring log to all appointments  RTC in 1 week for reeevaluation  Verbalizes all understanding              Orthopedic    Right hip pain     Recent fall; Hx QUANG  XR today  Will call with results         Relevant Orders    X-Ray Hip 2 or 3 views Right (with Pelvis when performed) (Completed)    Hematoma of right hip     Recent fall; Hx QUANG  XR today  Will call with results  Apply cold compresses to hematoma  Instructed to continue to monitor symptoms  F/U if no improvement  Monitor for signs of infection (increased skin temperature, erythema, worsening swelling)  Report to ED for any CP, SOB, signs of infected hematoma, fever, chills, and/or worsening symptoms  RTC in 1 week for reevaluaton  Pt is agreeable to  plan and verbalizes understanding           Relevant Orders    X-Ray Hip 2 or 3 views Right (with Pelvis when performed) (Completed)

## 2024-03-25 NOTE — ASSESSMENT & PLAN NOTE
Recent fall; Hx QUANG  XR today  Will call with results  Apply cold compresses to hematoma  Instructed to continue to monitor symptoms  F/U if no improvement  Monitor for signs of infection (increased skin temperature, erythema, worsening swelling)  Report to ED for any CP, SOB, signs of infected hematoma, fever, chills, and/or worsening symptoms  RTC in 1 week for reevaluaton  Pt is agreeable to plan and verbalizes understanding

## 2024-03-25 NOTE — ASSESSMENT & PLAN NOTE
BP elevated; Asymptomatic  Took meds few minutes prior to appt; did not take Lasix today  Curently taking Lisinopril 20mg po (two tablets) daily and Lasix 40 mg po daily (with K+ supplement)   Continue current meds  Instructed to take BP meds prior to all clinic appointments  Instructed to report to ED for any BP >200/100, SOB, CP, and/or worsening symptoms  Daily BP check; bring log to all appointments  RTC in 1 week for reeevaluation  Verbalizes all understanding

## 2024-03-25 NOTE — PROGRESS NOTES
Please inform of XR    All hip hardware normal; no loosening or signs of infection    No hip fractures    -Please instruct to continue cold compresses on hematoma  -Monitor for signs of infection (hot to touch, swelling, etc...)  -Call me immediately for any signs of infection    -I will like him to call me on Thursday for an update; If no improvement, I will refer for minor procedure    -Report to ED for any fever, chills, or worsening symptoms/signs of infection

## 2024-04-03 ENCOUNTER — TELEPHONE (OUTPATIENT)
Dept: FAMILY MEDICINE | Facility: CLINIC | Age: 84
End: 2024-04-03
Payer: MEDICARE

## 2024-04-03 NOTE — TELEPHONE ENCOUNTER
"Pt called. Stated he has not seen an improvement since last office visit. He states "some days its smaller and and some days it's larger than normal. I am still applying a compress to it at times".   Advised pt to keep follow up appt on 4/8/24 @ 10:00 am for further evaluation.  Verbalized understood.  "

## 2024-04-04 DIAGNOSIS — S70.01XA HEMATOMA OF RIGHT HIP, INITIAL ENCOUNTER: Primary | ICD-10-CM

## 2024-04-04 NOTE — TELEPHONE ENCOUNTER
If no improvement, please inform that I would like to send him to have that hematoma drained in order to prevent infection.    If pt agrees, I will submit referral?

## 2024-04-05 ENCOUNTER — OFFICE VISIT (OUTPATIENT)
Dept: SURGERY | Facility: CLINIC | Age: 84
End: 2024-04-05
Payer: MEDICARE

## 2024-04-05 ENCOUNTER — TELEPHONE (OUTPATIENT)
Dept: SURGERY | Facility: CLINIC | Age: 84
End: 2024-04-05

## 2024-04-05 DIAGNOSIS — S70.01XA HEMATOMA OF RIGHT HIP, INITIAL ENCOUNTER: ICD-10-CM

## 2024-04-05 PROCEDURE — 99203 OFFICE O/P NEW LOW 30 MIN: CPT | Mod: ,,, | Performed by: SURGERY

## 2024-04-05 NOTE — TELEPHONE ENCOUNTER
Pt left medication bag in an exam room today  Tried calling pt to let him know that since we close at noon today we are going to bring it down to [facility] reception desk for him to   No answer, LM

## 2024-04-05 NOTE — PROGRESS NOTES
HISTORY & PHYSICAL  General Surgery    Patient Name: Eloy Patel  YOB: 1940    Date: 12/28/2024                   SUBJECTIVE:     Chief Complaint/Reason for Admission:   Chief Complaint   Patient presents with    Consult     Hematoma right hip        History of Present Illness:  Mr. Eloy Patel is a 84 y.o. male who reports a hematoma of the hip.  He has a history of previous surgical manipulation.  Denies any fever / chills.     Review of Systems:  12 point ROS negative except as stated in HPI    PAST HISTORY:     Past Medical History:   Diagnosis Date    Anemia, unspecified     Aortic atherosclerosis 09/27/2024    CAD (coronary artery disease)     Carotid artery stenosis     CHF (congestive heart failure)     History of elevated PSA 08/31/2022    Hyperlipidemia 08/31/2022    Hypothyroidism     Mobility impaired     WOLF on CPAP     Osteoarthritis of right hip     Paroxysmal atrial fibrillation     Pleural effusion, bilateral 04/03/2023    Primary hypertension 08/31/2022     Past Surgical History:   Procedure Laterality Date    AORTIC VALVE REPLACEMENT  2023    CATARACT EXTRACTION Left 2016    CHOLECYSTECTOMY  2003    COLONOSCOPY  06/24/2019    COLONOSCOPY  2008    CORONARY ARTERY BYPASS GRAFT  2023    excision right interior neck  02/07/2019    HERNIA REPAIR      MOUTH SURGERY      TONSILLECTOMY       No family history on file.  Social History     Socioeconomic History    Marital status:    Occupational History    Occupation: retired   Tobacco Use    Smoking status: Former     Types: Cigarettes     Passive exposure: Never    Smokeless tobacco: Never   Substance and Sexual Activity    Alcohol use: Yes     Comment: Occassionally (beer)    Drug use: Never     Social Drivers of Health     Financial Resource Strain: Low Risk  (8/23/2023)    [facility] from [facility], [facility]    Overall Financial Resource Strain (CARDIA)     Difficulty of Paying Living Expenses: Not hard at all   Food Insecurity: No Food Insecurity (11/2/2023)    [facility]    Hunger Vital Sign     Worried About Running Out of Food in the Last Year: Never true     Ran Out of Food in the Last Year: Never true   Transportation Needs: No Transportation Needs (8/23/2023)    [facility], [facility]    PRAPARE - Transportation     Lack of Transportation (Medical): No     Lack of Transportation (Non-Medical): No   Housing Stability: Unknown (8/23/2023)    [facility], [facility]    Housing Stability Vital Sign     Unable to Pay for Housing in the Last Year: No       MEDICATIONS & ALLERGIES:     Current Outpatient Medications on File Prior to Visit   Medication Sig    ADULT LOW DOSE ASPIRIN 81 mg EC tablet Take 81 mg by mouth.    docusate sodium (COLACE) 100 MG capsule Take 1 capsule by mouth every evening.    levothyroxine (SYNTHROID) 50 MCG tablet TAKE 1 TABLET BY MOUTH EVERY DAY    tamsulosin (FLOMAX) 0.4 mg Cap Take 0.4 mg by mouth once daily at 6am.    TYLENOL 325 mg tablet Take by mouth as needed.    multivit-min-FA-lycopen-lutein 300-600-300 mcg Tab Take 1 tablet by mouth once daily.     No current facility-administered medications on file prior to visit.     Review of patient's allergies indicates:   Allergen Reactions    Sulfamethoxazole-trimethoprim      Other reaction(s): vomiting    Sulfa  (sulfonamide antibiotics) Rash and Itching       OBJECTIVE:   There were no vitals filed for this visit.  There is no height or weight on file to calculate BMI.    Physical Exam:  General:  Well developed, well nourished, no acute distress  HEENT:  Normocephalic, atraumatic, PERRL, EOMI, clear sclera, ears normal, neck supple, throat clear without erythema or exudates  CVS:  RRR, S1 and S2 normal, no murmurs, rubs, gallops  Resp:  Lungs clear to auscultation, no wheezes, rales, rhonchi, cough  GI:  Abdomen soft, non-tender, non-distended, normoactive bowel sounds, no masses  :  Deferred  MSK:  No muscle atrophy, cyanosis, peripheral edema, full range of motion  Skin:  No rashes, ulcers, erythema R hip - hematoma / no erythema / edema  Neuro:  CNII-XII grossly intact  Psych:  Alert and oriented to person, place, and time    Results:  I have independently reviewed all pertinent lab and radiologic studies relevant to general/bariatric surgery.      VISIT DIAGNOSES:       ICD-10-CM ICD-9-CM   1. Hematoma of right hip, initial encounter  S70.01XA 924.01       ASSESSMENT/PLAN:     85 yo male with R hip hematoma    -  No surgical intervention warranted at this time    RTC prn

## 2024-07-30 ENCOUNTER — TELEPHONE (OUTPATIENT)
Dept: FAMILY MEDICINE | Facility: CLINIC | Age: 84
End: 2024-07-30
Payer: MEDICARE

## 2024-07-30 DIAGNOSIS — R79.9 ABNORMAL BLOOD CHEMISTRY: ICD-10-CM

## 2024-07-30 DIAGNOSIS — G47.33 OSA ON CPAP: ICD-10-CM

## 2024-07-30 DIAGNOSIS — Z87.898 HISTORY OF ELEVATED PSA: ICD-10-CM

## 2024-07-30 DIAGNOSIS — Z00.00 WELLNESS EXAMINATION: ICD-10-CM

## 2024-07-30 DIAGNOSIS — Z86.79 HISTORY OF ATRIAL FIBRILLATION: ICD-10-CM

## 2024-07-30 DIAGNOSIS — E03.8 OTHER SPECIFIED HYPOTHYROIDISM: ICD-10-CM

## 2024-07-30 DIAGNOSIS — I10 PRIMARY HYPERTENSION: Primary | ICD-10-CM

## 2024-07-30 DIAGNOSIS — E78.2 MIXED HYPERLIPIDEMIA: ICD-10-CM

## 2024-07-30 DIAGNOSIS — D64.9 ANEMIA, UNSPECIFIED TYPE: ICD-10-CM

## 2024-07-30 DIAGNOSIS — E78.5 HYPERLIPIDEMIA, UNSPECIFIED HYPERLIPIDEMIA TYPE: ICD-10-CM

## 2024-08-26 LAB
CHOLEST SERPL-MSCNC: 126 MG/DL (ref 0–200)
HDLC SERPL-MCNC: 62 MG/DL (ref 35–70)
LDLC SERPL CALC-MCNC: 52 MG/DL (ref 0–160)
TRIGL SERPL-MCNC: 59 MG/DL (ref 40–160)

## 2024-08-26 RX ORDER — LISINOPRIL 20 MG/1
40 TABLET ORAL DAILY
Qty: 60 TABLET | Refills: 0 | Status: SHIPPED | OUTPATIENT
Start: 2024-08-26 | End: 2024-09-25

## 2024-08-26 NOTE — TELEPHONE ENCOUNTER
----- Message from Zeniab Yu sent at 8/26/2024 10:49 AM CDT -----  Who Called: Eloy Patel    Refill or New Rx:Refill  RX Name and Strength:lisinopriL (PRINIVIL,ZESTRIL) 20 MG tablet  How is the patient currently taking it? (ex. 1XDay):2x  Is this a 30 day or 90 day RX:  Local or Mail Order:  List of preferred pharmacies on file (remove unneeded): [unfilled]  If different Pharmacy is requested, enter Pharmacy information here including location and phone number: [facility] [address]    Ordering Provider:        Patient's Preferred Phone Number on File: [phone]   Best Call Back Number, if different:  Additional Information:

## 2024-08-28 ENCOUNTER — DOCUMENTATION ONLY (OUTPATIENT)
Dept: FAMILY MEDICINE | Facility: CLINIC | Age: 84
End: 2024-08-28
Payer: MEDICARE

## 2024-09-04 DIAGNOSIS — E78.2 MIXED HYPERLIPIDEMIA: ICD-10-CM

## 2024-09-04 RX ORDER — ATORVASTATIN CALCIUM 40 MG/1
40 TABLET, FILM COATED ORAL NIGHTLY
Qty: 90 TABLET | Refills: 3 | Status: SHIPPED | OUTPATIENT
Start: 2024-09-04 | End: 2025-09-04

## 2024-09-04 NOTE — TELEPHONE ENCOUNTER
----- Message from Etienne Hartman sent at 9/4/2024 10:58 AM CDT -----  .Type:  RX Refill Request    Who Called: pt   Refill or New Rx:refill   RX Name and Strength:atorvastatin (LIPITOR) 40 MG tablet  How is the patient currently taking it? (ex. 1XDay): Take 1 tablet (40 mg total) by mouth every evening. - Oral  Is this a 30 day or 90 day RX:90  Preferred Pharmacy with phone number:[id number] [address]  Local or Mail Order:local   Ordering Provider:[place]   Would the patient rather a call back or a response via [facility]? Call back   Best Call Back Number:[phone]  Additional Information:

## 2024-09-16 ENCOUNTER — LAB VISIT (OUTPATIENT)
Dept: LAB | Facility: HOSPITAL | Age: 84
End: 2024-09-16
Attending: NURSE PRACTITIONER
Payer: MEDICARE

## 2024-09-16 DIAGNOSIS — R79.9 ABNORMAL FINDING OF BLOOD CHEMISTRY, UNSPECIFIED: ICD-10-CM

## 2024-09-16 DIAGNOSIS — Z00.00 WELLNESS EXAMINATION: Primary | ICD-10-CM

## 2024-09-16 DIAGNOSIS — Z00.00 WELLNESS EXAMINATION: ICD-10-CM

## 2024-09-16 LAB
ALBUMIN SERPL-MCNC: 3.7 G/DL (ref 3.4–4.8)
ALBUMIN/GLOB SERPL: 1.4 RATIO (ref 1.1–2)
ALP SERPL-CCNC: 188 UNIT/L (ref 40–150)
ALT SERPL-CCNC: 32 UNIT/L (ref 0–55)
ANION GAP SERPL CALC-SCNC: 7 MEQ/L
AST SERPL-CCNC: 30 UNIT/L (ref 5–34)
BACTERIA #/AREA URNS AUTO: NORMAL /HPF
BASOPHILS # BLD AUTO: 0.05 X10(3)/MCL
BASOPHILS NFR BLD AUTO: 1 %
BILIRUB SERPL-MCNC: 0.9 MG/DL
BILIRUB UR QL STRIP.AUTO: NEGATIVE
BUN SERPL-MCNC: 21.5 MG/DL (ref 8.4–25.7)
CALCIUM SERPL-MCNC: 9.6 MG/DL (ref 8.8–10)
CHLORIDE SERPL-SCNC: 107 MMOL/L (ref 98–107)
CLARITY UR: CLEAR
CO2 SERPL-SCNC: 26 MMOL/L (ref 23–31)
COLOR UR AUTO: YELLOW
CREAT SERPL-MCNC: 0.78 MG/DL (ref 0.73–1.18)
CREAT/UREA NIT SERPL: 28
EOSINOPHIL # BLD AUTO: 0.18 X10(3)/MCL (ref 0–0.9)
EOSINOPHIL NFR BLD AUTO: 3.5 %
ERYTHROCYTE [DISTWIDTH] IN BLOOD BY AUTOMATED COUNT: 14 % (ref 11.5–17)
EST. AVERAGE GLUCOSE BLD GHB EST-MCNC: 102.5 MG/DL
GFR SERPLBLD CREATININE-BSD FMLA CKD-EPI: >60 ML/MIN/1.73/M2
GLOBULIN SER-MCNC: 2.6 GM/DL (ref 2.4–3.5)
GLUCOSE SERPL-MCNC: 98 MG/DL (ref 82–115)
GLUCOSE UR QL STRIP: NEGATIVE
HBA1C MFR BLD: 5.2 %
HCT VFR BLD AUTO: 36.5 % (ref 42–52)
HGB BLD-MCNC: 12.7 G/DL (ref 14–18)
HGB UR QL STRIP: ABNORMAL
IMM GRANULOCYTES # BLD AUTO: 0.01 X10(3)/MCL (ref 0–0.04)
IMM GRANULOCYTES NFR BLD AUTO: 0.2 %
KETONES UR QL STRIP: NEGATIVE
LEUKOCYTE ESTERASE UR QL STRIP: NEGATIVE
LYMPHOCYTES # BLD AUTO: 1.11 X10(3)/MCL (ref 0.6–4.6)
LYMPHOCYTES NFR BLD AUTO: 21.6 %
MCH RBC QN AUTO: 32.1 PG (ref 27–31)
MCHC RBC AUTO-ENTMCNC: 34.8 G/DL (ref 33–36)
MCV RBC AUTO: 92.2 FL (ref 80–94)
MONOCYTES # BLD AUTO: 0.44 X10(3)/MCL (ref 0.1–1.3)
MONOCYTES NFR BLD AUTO: 8.5 %
NEUTROPHILS # BLD AUTO: 3.36 X10(3)/MCL (ref 2.1–9.2)
NEUTROPHILS NFR BLD AUTO: 65.2 %
NITRITE UR QL STRIP: NEGATIVE
NRBC BLD AUTO-RTO: 0 %
PH UR STRIP: 6 [PH]
PLATELET # BLD AUTO: 149 X10(3)/MCL (ref 130–400)
PMV BLD AUTO: 9.7 FL (ref 7.4–10.4)
POTASSIUM SERPL-SCNC: 4 MMOL/L (ref 3.5–5.1)
PROT SERPL-MCNC: 6.3 GM/DL (ref 5.8–7.6)
PROT UR QL STRIP: ABNORMAL
RBC # BLD AUTO: 3.96 X10(6)/MCL (ref 4.7–6.1)
RBC #/AREA URNS AUTO: NORMAL /HPF
SODIUM SERPL-SCNC: 140 MMOL/L (ref 136–145)
SP GR UR STRIP.AUTO: >=1.03 (ref 1–1.03)
SQUAMOUS #/AREA URNS AUTO: NORMAL /HPF
UROBILINOGEN UR STRIP-ACNC: 0.2
WBC # BLD AUTO: 5.15 X10(3)/MCL (ref 4.5–11.5)
WBC #/AREA URNS AUTO: NORMAL /HPF

## 2024-09-16 PROCEDURE — 80053 COMPREHEN METABOLIC PANEL: CPT

## 2024-09-16 PROCEDURE — 81001 URINALYSIS AUTO W/SCOPE: CPT

## 2024-09-16 PROCEDURE — 36415 COLL VENOUS BLD VENIPUNCTURE: CPT

## 2024-09-16 PROCEDURE — 83036 HEMOGLOBIN GLYCOSYLATED A1C: CPT

## 2024-09-16 PROCEDURE — 81003 URINALYSIS AUTO W/O SCOPE: CPT

## 2024-09-16 PROCEDURE — 85025 COMPLETE CBC W/AUTO DIFF WBC: CPT

## 2024-09-17 RX ORDER — LISINOPRIL 20 MG/1
40 TABLET ORAL
Qty: 180 TABLET | Refills: 1 | Status: SHIPPED | OUTPATIENT
Start: 2024-09-17

## 2024-09-27 ENCOUNTER — OFFICE VISIT (OUTPATIENT)
Dept: FAMILY MEDICINE | Facility: CLINIC | Age: 84
End: 2024-09-27
Payer: MEDICARE

## 2024-09-27 VITALS
TEMPERATURE: 97 F | BODY MASS INDEX: 26.29 KG/M2 | RESPIRATION RATE: 20 BRPM | WEIGHT: 177.5 LBS | HEART RATE: 76 BPM | DIASTOLIC BLOOD PRESSURE: 82 MMHG | HEIGHT: 69 IN | SYSTOLIC BLOOD PRESSURE: 174 MMHG | OXYGEN SATURATION: 98 %

## 2024-09-27 DIAGNOSIS — I70.0 AORTIC ATHEROSCLEROSIS: ICD-10-CM

## 2024-09-27 DIAGNOSIS — E03.9 ACQUIRED HYPOTHYROIDISM: ICD-10-CM

## 2024-09-27 DIAGNOSIS — E78.2 MIXED HYPERLIPIDEMIA: ICD-10-CM

## 2024-09-27 DIAGNOSIS — I65.23 BILATERAL CAROTID ARTERY STENOSIS: ICD-10-CM

## 2024-09-27 DIAGNOSIS — M25.562 CHRONIC PAIN OF BOTH KNEES: ICD-10-CM

## 2024-09-27 DIAGNOSIS — G89.29 CHRONIC PAIN OF BOTH KNEES: ICD-10-CM

## 2024-09-27 DIAGNOSIS — M25.561 CHRONIC PAIN OF BOTH KNEES: ICD-10-CM

## 2024-09-27 DIAGNOSIS — G47.33 OSA ON CPAP: ICD-10-CM

## 2024-09-27 DIAGNOSIS — I11.0 HYPERTENSIVE HEART DISEASE WITH HEART FAILURE: ICD-10-CM

## 2024-09-27 DIAGNOSIS — Z00.00 WELLNESS EXAMINATION: Primary | ICD-10-CM

## 2024-09-27 DIAGNOSIS — I10 PRIMARY HYPERTENSION: ICD-10-CM

## 2024-09-27 DIAGNOSIS — I48.91 ATRIAL FIBRILLATION, UNSPECIFIED TYPE: ICD-10-CM

## 2024-09-27 DIAGNOSIS — I25.10 CORONARY ARTERY DISEASE INVOLVING NATIVE CORONARY ARTERY OF NATIVE HEART WITHOUT ANGINA PECTORIS: ICD-10-CM

## 2024-09-27 PROBLEM — J41.0 SIMPLE CHRONIC BRONCHITIS: Status: ACTIVE | Noted: 2024-09-27

## 2024-09-27 PROBLEM — J41.0 SIMPLE CHRONIC BRONCHITIS: Status: RESOLVED | Noted: 2024-09-27 | Resolved: 2024-09-27

## 2024-09-27 NOTE — PROGRESS NOTES
Patient ID: [id number]     Chief Complaint: Medicare AWV and Lab Results      HPI:     Eloy Patel is a 84 y.o. male here today for a Medicare Wellness.  Denies any acute problems.       HTN:  Did not take Lisinopril this morning.  Denies any headaches, weakness, dizziness, CP, or SOB. Denies any other problems.      Labs previously done and reviewed with patient  Influenza/RSV/Tdap due; states will complete at local pharmacy    Opioid Screening: Patient medication list reviewed, patient is not taking prescription opioids. Patient is not using additional opioids than prescribed. Patient is at low risk of substance abuse based on this opioid use history.       -------------------------------------    Anemia, unspecified    CAD (coronary artery disease)    Carotid artery stenosis    CHF (congestive heart failure)    History of elevated PSA    Hyperlipidemia    Hypothyroidism    Mobility impaired    WOLF on CPAP    Osteoarthritis of right hip    Paroxysmal atrial fibrillation    Pleural effusion, bilateral    Primary hypertension        Past Surgical History:   Procedure Laterality Date    AORTIC VALVE REPLACEMENT  2023    CATARACT EXTRACTION Left 2016    CHOLECYSTECTOMY  2003    COLONOSCOPY  06/24/2019    COLONOSCOPY  2008    CORONARY ARTERY BYPASS GRAFT  2023    excision right interior neck  02/07/2019    HERNIA REPAIR      MOUTH SURGERY      TONSILLECTOMY         Review of patient's allergies indicates:   Allergen Reactions    Sulfamethoxazole-trimethoprim      Other reaction(s): vomiting    Sulfa (sulfonamide antibiotics) Rash and Itching       Outpatient Medications Marked as Taking for the 9/27/24 encounter (Office Visit) with Carlee Wolf NP   Medication Sig Dispense Refill    ADULT LOW DOSE ASPIRIN 81 mg EC tablet Take 81 mg by mouth.      atorvastatin (LIPITOR) 40 MG tablet Take 1 tablet (40 mg total) by mouth every evening. 90 tablet 3    docusate sodium (COLACE) 100 MG capsule Take 1 capsule by mouth  every evening.      levothyroxine (SYNTHROID) 50 MCG tablet TAKE 1 TABLET BY MOUTH EVERY DAY 90 tablet 3    lisinopriL (PRINIVIL,ZESTRIL) 20 MG tablet TAKE 2 TABLETS BY MOUTH ONCE DAILY 180 tablet 1    multivit-min-FA-lycopen-lutein 300-600-300 mcg Tab Take 1 tablet by mouth once daily.      tamsulosin (FLOMAX) 0.4 mg Cap Take 0.4 mg by mouth once daily at 6am.      TYLENOL 325 mg tablet Take by mouth as needed.         Social History     Socioeconomic History    Marital status:    Occupational History    Occupation: retired   Tobacco Use    Smoking status: Former     Types: Cigarettes     Passive exposure: Never    Smokeless tobacco: Never   Substance and Sexual Activity    Alcohol use: Yes     Comment: Occassionally (beer)    Drug use: Never     Social Determinants of Health     Financial Resource Strain: Low Risk  (8/23/2023)    Received from [facility], [email or web address]    Overall Financial Resource Strain (CARDIA)     Difficulty of Paying Living Expenses: Not hard at all   Food Insecurity: No Food Insecurity (11/2/2023)    Received from [facility] Subsidiaries and Affiliates, [email or web address] and [facility]ates    Hunger Vital Sign     Worried About Running Out of Food in the Last Year: Never true     Ran Out of Food in the Last Year: Never true   Transportation Needs: No Transportation Needs (8/23/2023)    Received from [facility], [email or web address] and [facility]    PRAPARE - Transportation     Lack of Transportation (Medical): No     Lack of Transportation (Non-Medical): No   Housing Stability: Unknown (8/23/2023)    Received from  "[facility] and Affiliates, [facility]    Housing Stability Vital Sign     Unable to Pay for Housing in the Last Year: No        No family history on file.     Patient Care Team:  Dana Corrales MD as PCP - General (Family Medicine)  Dana Corrales MD Leleux, Jon D., MD as Consulting Physician (Cardiology)  Jaquan Yu MD as Consulting Physician (Cardiothoracic Surgery)  Nicanor Canseco MD (Dermatology)  Onofre Rivera MD as Consulting Physician (Gastroenterology)  Leonard Talamantes III, MD as Consulting Physician (General Surgery)  Jose Manuel Daniels MD as Consulting Physician (Urology)  Nadja Cortes MD (Pulmonary Disease)  Donovan Garcia Jr., MD as Surgeon (General Surgery)       Subjective:     Review of Systems   All other systems reviewed and are negative.        Patient Reported Health Risk Assessment       Objective:     BP (!) 174/82 (BP Location: Left arm, Patient Position: Sitting, BP Method: Medium (Manual))   Pulse 76   Temp 97.3 °F (36.3 °C) (Oral)   Resp 20   Ht 5' 9" (1.753 m)   Wt 80.5 kg (177 lb 8 oz)   SpO2 98%   BMI 26.21 kg/m²     Physical Exam  Vitals reviewed.   Constitutional:       Appearance: He is not toxic-appearing.   HENT:      Head: Normocephalic.      Mouth/Throat:      Mouth: Mucous membranes are moist.   Eyes:      Extraocular Movements: Extraocular movements intact.      Pupils: Pupils are equal, round, and reactive to light.   Cardiovascular:      Rate and Rhythm: Normal rate and regular rhythm.      Pulses: Normal pulses.      Heart sounds: Normal heart sounds.   Pulmonary:      Effort: Pulmonary effort is normal. No respiratory distress.      Breath sounds: Normal breath sounds.   Abdominal:      General: Bowel sounds are normal. There is no distension.      Palpations: Abdomen is soft.      Tenderness: There is no " abdominal tenderness.   Musculoskeletal:         General: No tenderness. Normal range of motion.      Cervical back: Neck supple.   Skin:     General: Skin is warm and dry.   Neurological:      Mental Status: He is alert and oriented to person, place, and time.   Psychiatric:         Mood and Affect: Mood normal.                No data to display                  9/27/2024     9:40 AM 4/5/2024     9:00 AM 3/25/2024    12:40 PM 10/18/2023    10:20 AM 9/25/2023    10:00 AM 9/14/2023     3:30 PM 4/3/2023     9:40 AM   Fall Risk Assessment - Outpatient   Mobility Status Ambulatory w/ assistance Ambulatory Ambulatory w/ assistance Ambulatory w/ assistance Ambulatory Ambulatory Ambulatory w/ assistance   Number of falls 2+ 0 2+ 0 0 0 0   Identified as fall risk True False True True False False True              Assessment/Plan:     1. Wellness examination  Assessment & Plan:  Influenza/RSV/Tdap due; states will complete at local pharmacy  Labs completed      2. Primary hypertension  Assessment & Plan:  BP elevated; Asymptomatic  Did not take BP medications today  Instructed to take BP meds prior to all clinic appointments  Instructed to report to ED for any BP >200/100, SOB, CP, and/or worsening symptoms  Daily BP check; bring log to all appointments  RTC in 1-2 weeks for reevaluation  Verbalizes all understanding        3. Mixed hyperlipidemia  Assessment & Plan:  Stable  Continue Statin  Keep appt with PCP for follow up      4. Hypertensive heart disease with heart failure  Assessment & Plan:  BP elevated; Asymptomatic  Did not take BP medications today  Instructed to take BP meds prior to all clinic appointments  Instructed to report to ED for any BP >200/100, SOB, CP, and/or worsening symptoms  Daily BP check; bring log to all appointments  Keep appt for follow up  Verbalizes all understanding        5. Atrial fibrillation, unspecified type  Assessment & Plan:  Stable  Keep appt with Cardiology for follow up        6.  Aortic atherosclerosis  Assessment & Plan:  Stable  Keep appt with Cardiology for follow up        7. Bilateral carotid artery stenosis  Assessment & Plan:  Stable  Keep appt with Cardiology for follow up        8. Coronary artery disease involving native coronary artery of native heart without angina pectoris  Assessment & Plan:  Stable  Keep appt with Cardiology for follow up        9. Acquired hypothyroidism  Assessment & Plan:  Stable  Continue Levothyroxine as prescribed  Keep appt with PCP for follow up        10. WOLF on CPAP  Assessment & Plan:  Stable  Will resume C-PAP use  Keep appt with PCP for follow up        11. Chronic pain of both knees  Assessment & Plan:  Stable  Keep appt with Ortho for follow up               Medicare Annual Wellness and Personalized Prevention Plan:   Fall Risk + Home Safety + Hearing Impairment + Depression Screen + Opioid and Substance Abuse Screening + Cognitive Impairment Screen + Health Risk Assessment all reviewed.     Health Maintenance Topics with due status: Not Due       Topic Last Completion Date    Aspirin/Antiplatelet Therapy 04/05/2024    Lipid Panel 08/26/2024      The patient's Health Maintenance was reviewed and the following appears to be due at this time:   Health Maintenance Due   Topic Date Due    TETANUS VACCINE  Never done    RSV Vaccine (Age 60+ and Pregnant patients) (1 - 1-dose 60+ series) Never done    Influenza Vaccine (1) 09/01/2024       Advance Care Planning   I attest to discussing Advance Care Planning with patient and/or family member.  Education was provided including the importance of the Health Care Power of , Advance Directives, and/or [place] documentation.  The patient expressed understanding to the importance of this information and discussion.         Follow up in about 2 weeks (around 10/11/2024) for NP Luciano HTN 2 weeks AND 6 months PCP. In addition to their scheduled follow up, the patient has also been instructed to follow  up on as needed basis.

## 2024-09-27 NOTE — ASSESSMENT & PLAN NOTE
BP elevated; Asymptomatic  Did not take BP medications today  Instructed to take BP meds prior to all clinic appointments  Instructed to report to ED for any BP >200/100, SOB, CP, and/or worsening symptoms  Daily BP check; bring log to all appointments  RTC in 1-2 weeks for reevaluation  Verbalizes all understanding

## 2024-09-27 NOTE — ASSESSMENT & PLAN NOTE
BP elevated; Asymptomatic  Did not take BP medications today  Instructed to take BP meds prior to all clinic appointments  Instructed to report to ED for any BP >200/100, SOB, CP, and/or worsening symptoms  Daily BP check; bring log to all appointments  Keep appt for follow up  Verbalizes all understanding

## 2024-10-11 ENCOUNTER — OFFICE VISIT (OUTPATIENT)
Dept: FAMILY MEDICINE | Facility: CLINIC | Age: 84
End: 2024-10-11
Payer: MEDICARE

## 2024-10-11 VITALS
BODY MASS INDEX: 26.22 KG/M2 | SYSTOLIC BLOOD PRESSURE: 142 MMHG | WEIGHT: 177 LBS | OXYGEN SATURATION: 96 % | HEART RATE: 76 BPM | TEMPERATURE: 98 F | DIASTOLIC BLOOD PRESSURE: 66 MMHG | RESPIRATION RATE: 20 BRPM | HEIGHT: 69 IN

## 2024-10-11 DIAGNOSIS — I10 PRIMARY HYPERTENSION: Primary | ICD-10-CM

## 2024-10-11 RX ORDER — METOPROLOL SUCCINATE 25 MG/1
12.5 TABLET, EXTENDED RELEASE ORAL DAILY
Qty: 45 TABLET | Refills: 3 | Status: SHIPPED | OUTPATIENT
Start: 2024-10-11 | End: 2025-10-11

## 2024-10-11 RX ORDER — FUROSEMIDE 40 MG/1
40 TABLET ORAL DAILY
Start: 2024-10-11 | End: 2024-10-11

## 2024-10-11 RX ORDER — FUROSEMIDE 40 MG/1
40 TABLET ORAL DAILY PRN
Start: 2024-10-11

## 2024-10-11 NOTE — PROGRESS NOTES
Subjective:      Patient ID: Eloy Patel is a 84 y.o. White male      Chief Complaint: Follow-up (Hypertension)       Past Medical History:   Diagnosis Date    Anemia, unspecified     Aortic atherosclerosis 09/27/2024    CAD (coronary artery disease)     Carotid artery stenosis     CHF (congestive heart failure)     History of elevated PSA 08/31/2022    Hyperlipidemia 08/31/2022    Hypothyroidism     Mobility impaired     WOLF on CPAP     Osteoarthritis of right hip     Paroxysmal atrial fibrillation     Pleural effusion, bilateral 04/03/2023    Primary hypertension 08/31/2022        HPI  Presents today for re-evaluation of BP.      HTN:  BP elevated at previous visit.  No medication changes were made.  BP elevated today; states 's-150's at home.  Currently taking Lisinopril 20 mg (two tablets) and Lasix/K+ prn edema. States compliance with medications.  Denies any headaches, dizziness, syncope, CP, or SOB.  Denies any other problems.            Patient Care Team:  Dana Corrales MD as PCP - General (Family Medicine)  Dana Corrales MD Leleux, Jon D., MD as Consulting Physician (Cardiology)  Jaquan Yu MD as Consulting Physician (Cardiothoracic Surgery)  Nicanor Canseco MD (Dermatology)  Onofre Rivera MD as Consulting Physician (Gastroenterology)  Leonard Talamantes III, MD as Consulting Physician (General Surgery)  Jose Manuel Daniesl MD as Consulting Physician (Urology)  Nadja Cortes MD (Pulmonary Disease)  Donovan Garcia Jr., MD as Surgeon (General Surgery)      Review of Systems   Constitutional:  Negative for chills, fatigue, fever and unexpected weight change.   HENT: Negative.     Eyes: Negative.    Respiratory: Negative.  Negative for shortness of breath.    Cardiovascular: Negative.  Negative for chest pain.   Gastrointestinal: Negative.    Endocrine: Negative.    Genitourinary: Negative.    Musculoskeletal: Negative.    Integumentary:  Negative.   Allergic/Immunologic:  Negative.    Neurological: Negative.  Negative for weakness.   Hematological: Negative.    Psychiatric/Behavioral: Negative.     All other systems reviewed and are negative.          Objective:      Vitals:    10/11/24 1008   BP: (!) 142/66   Pulse:    Resp:    Temp:       Body mass index is 26.14 kg/m².     Physical Exam  Vitals reviewed.   Constitutional:       Appearance: He is not toxic-appearing.   HENT:      Head: Normocephalic.      Mouth/Throat:      Mouth: Mucous membranes are moist.   Eyes:      Extraocular Movements: Extraocular movements intact.      Pupils: Pupils are equal, round, and reactive to light.   Cardiovascular:      Rate and Rhythm: Normal rate and regular rhythm.      Pulses: Normal pulses.      Heart sounds: Normal heart sounds.   Pulmonary:      Effort: Pulmonary effort is normal. No respiratory distress.      Breath sounds: Normal breath sounds.   Musculoskeletal:         General: No tenderness. Normal range of motion.      Cervical back: Neck supple.   Skin:     General: Skin is warm and dry.   Neurological:      Mental Status: He is alert and oriented to person, place, and time.   Psychiatric:         Mood and Affect: Mood normal.            Current Outpatient Medications:     ADULT LOW DOSE ASPIRIN 81 mg EC tablet, Take 81 mg by mouth., Disp: , Rfl:     atorvastatin (LIPITOR) 40 MG tablet, Take 1 tablet (40 mg total) by mouth every evening., Disp: 90 tablet, Rfl: 3    docusate sodium (COLACE) 100 MG capsule, Take 1 capsule by mouth every evening., Disp: , Rfl:     levothyroxine (SYNTHROID) 50 MCG tablet, TAKE 1 TABLET BY MOUTH EVERY DAY, Disp: 90 tablet, Rfl: 3    lisinopriL (PRINIVIL,ZESTRIL) 20 MG tablet, TAKE 2 TABLETS BY MOUTH ONCE DAILY, Disp: 180 tablet, Rfl: 1    multivit-min-FA-lycopen-lutein 300-600-300 mcg Tab, Take 1 tablet by mouth once daily., Disp: , Rfl:     tamsulosin (FLOMAX) 0.4 mg Cap, Take 0.4 mg by mouth once daily at 6am., Disp: , Rfl:     TYLENOL 325 mg tablet,  Take by mouth as needed., Disp: , Rfl:     furosemide (LASIX) 40 MG tablet, Take 1 tablet (40 mg total) by mouth daily as needed (swelling)., Disp: , Rfl:     metoprolol succinate (TOPROL-XL) 25 MG 24 hr tablet, Take 0.5 tablets (12.5 mg total) by mouth once daily., Disp: 45 tablet, Rfl: 3    Assessment & Plan:     Problem List Items Addressed This Visit          Cardiac/Vascular    Primary hypertension - Primary     BP elevated; Asymptomatic  Currently taking Lisinopril 20 mg (two tablets). Takes Lasix prn.   Continue Lisinopril daily and Lasix prn  Add Metoprolol XL 25 mg (1/2 tablet) daily; Rx sent  Instructed to take BP meds prior to all clinic appointments  Instructed to report to ED for any BP >200/100, SOB, CP, and/or worsening symptoms  Daily BP check; bring log to all appointments  RTC in 1-2 weeks  Verbalizes all understanding           Relevant Medications    metoprolol succinate (TOPROL-XL) 25 MG 24 hr tablet

## 2024-10-11 NOTE — ASSESSMENT & PLAN NOTE
BP elevated; Asymptomatic  Currently taking Lisinopril 20 mg (two tablets). Takes Lasix prn.   Continue Lisinopril daily and Lasix prn  Add Metoprolol XL 25 mg (1/2 tablet) daily; Rx sent  Instructed to take BP meds prior to all clinic appointments  Instructed to report to ED for any BP >200/100, SOB, CP, and/or worsening symptoms  Daily BP check; bring log to all appointments  RTC in 1-2 weeks  Verbalizes all understanding

## 2024-10-23 ENCOUNTER — OFFICE VISIT (OUTPATIENT)
Dept: FAMILY MEDICINE | Facility: CLINIC | Age: 84
End: 2024-10-23
Payer: MEDICARE

## 2024-10-23 VITALS
OXYGEN SATURATION: 96 % | DIASTOLIC BLOOD PRESSURE: 61 MMHG | HEIGHT: 69 IN | BODY MASS INDEX: 26.27 KG/M2 | RESPIRATION RATE: 19 BRPM | WEIGHT: 177.38 LBS | TEMPERATURE: 97 F | SYSTOLIC BLOOD PRESSURE: 132 MMHG | HEART RATE: 73 BPM

## 2024-10-23 DIAGNOSIS — I10 PRIMARY HYPERTENSION: Primary | ICD-10-CM

## 2024-10-23 PROCEDURE — 99213 OFFICE O/P EST LOW 20 MIN: CPT | Mod: ,,, | Performed by: NURSE PRACTITIONER

## 2024-10-23 NOTE — ASSESSMENT & PLAN NOTE
BP at goal  Continue current medication (Lisinopril 20 mg (two tablets), Metoprolol XL 25 mg (1/2 tablet) daily, and Lasix/K+ prn edema)  Daily BP check; bring log all clinic visits  Instructed to report to ED for any BP greater than 200/100, dizziness, syncope, CP, or SOB  Keep appt. With PCP for follow up  Patient is agreeable to plan and verbalizes understanding

## 2024-10-23 NOTE — PROGRESS NOTES
Subjective:      Patient ID: Eloy Patel is a 84 y.o. White male      Chief Complaint: Follow-up (Hypertension)       Past Medical History:   Diagnosis Date    Anemia, unspecified     Aortic atherosclerosis 09/27/2024    CAD (coronary artery disease)     Carotid artery stenosis     CHF (congestive heart failure)     History of elevated PSA 08/31/2022    Hyperlipidemia 08/31/2022    Hypothyroidism     Mobility impaired     WOLF on CPAP     Osteoarthritis of right hip     Paroxysmal atrial fibrillation     Pleural effusion, bilateral 04/03/2023    Primary hypertension 08/31/2022        HPI  Presents today for re-evaluation of BP.       HTN:  BP elevated at previous visit.  Medication changes were made.  BP at goal today.  Currently taking Lisinopril 20 mg (two tablets), Metoprolol XL 25 mg (1/2 tablet) daily, and Lasix/K+ prn edema. States compliance with medications.  Denies any headaches, dizziness, syncope, CP, or SOB.  Denies any other problems.          Patient Care Team:  Dana Corrales MD as PCP - General (Family Medicine)  Dana Corrales MD Leleux, Jon D., MD as Consulting Physician (Cardiology)  Jaquan Yu MD as Consulting Physician (Cardiothoracic Surgery)  Nicanor Canseco MD (Dermatology)  Onofre Rivera MD as Consulting Physician (Gastroenterology)  Leonard Talamantes III, MD as Consulting Physician (General Surgery)  Jose Manuel Daniels MD as Consulting Physician (Urology)  Nadja Cortes MD (Pulmonary Disease)  Donovan Garcia Jr., MD as Surgeon (General Surgery)      Review of Systems   Constitutional:  Negative for chills, fatigue, fever and unexpected weight change.   HENT: Negative.     Eyes: Negative.    Respiratory: Negative.  Negative for shortness of breath.    Cardiovascular: Negative.  Negative for chest pain.   Gastrointestinal: Negative.    Endocrine: Negative.    Genitourinary: Negative.    Musculoskeletal: Negative.    Integumentary:  Negative.    Allergic/Immunologic: Negative.    Neurological: Negative.  Negative for weakness.   Hematological: Negative.    Psychiatric/Behavioral: Negative.     All other systems reviewed and are negative.          Objective:      Vitals:    10/23/24 1309   BP: 132/61   Pulse:    Resp:    Temp:       Body mass index is 26.2 kg/m².     Physical Exam  Vitals reviewed.   Constitutional:       Appearance: He is not toxic-appearing.   HENT:      Head: Normocephalic.      Mouth/Throat:      Mouth: Mucous membranes are moist.   Eyes:      Extraocular Movements: Extraocular movements intact.      Pupils: Pupils are equal, round, and reactive to light.   Cardiovascular:      Rate and Rhythm: Normal rate and regular rhythm.      Pulses: Normal pulses.      Heart sounds: Normal heart sounds.   Pulmonary:      Effort: Pulmonary effort is normal. No respiratory distress.      Breath sounds: Normal breath sounds.   Abdominal:      General: Bowel sounds are normal. There is no distension.      Palpations: Abdomen is soft.      Tenderness: There is no abdominal tenderness.   Musculoskeletal:         General: No tenderness. Normal range of motion.      Cervical back: Neck supple.   Skin:     General: Skin is warm and dry.   Neurological:      Mental Status: He is alert and oriented to person, place, and time.   Psychiatric:         Mood and Affect: Mood normal.            Current Outpatient Medications:     ADULT LOW DOSE ASPIRIN 81 mg EC tablet, Take 81 mg by mouth., Disp: , Rfl:     atorvastatin (LIPITOR) 40 MG tablet, Take 1 tablet (40 mg total) by mouth every evening., Disp: 90 tablet, Rfl: 3    docusate sodium (COLACE) 100 MG capsule, Take 1 capsule by mouth every evening., Disp: , Rfl:     furosemide (LASIX) 40 MG tablet, Take 1 tablet (40 mg total) by mouth daily as needed (swelling)., Disp: , Rfl:     levothyroxine (SYNTHROID) 50 MCG tablet, TAKE 1 TABLET BY MOUTH EVERY DAY, Disp: 90 tablet, Rfl: 3    lisinopriL (PRINIVIL,ZESTRIL)  20 MG tablet, TAKE 2 TABLETS BY MOUTH ONCE DAILY, Disp: 180 tablet, Rfl: 1    metoprolol succinate (TOPROL-XL) 25 MG 24 hr tablet, Take 0.5 tablets (12.5 mg total) by mouth once daily., Disp: 45 tablet, Rfl: 3    multivit-min-FA-lycopen-lutein 300-600-300 mcg Tab, Take 1 tablet by mouth once daily., Disp: , Rfl:     tamsulosin (FLOMAX) 0.4 mg Cap, Take 0.4 mg by mouth once daily at 6am., Disp: , Rfl:     TYLENOL 325 mg tablet, Take by mouth as needed., Disp: , Rfl:     Assessment & Plan:     Problem List Items Addressed This Visit          Cardiac/Vascular    Primary hypertension - Primary     BP at goal  Continue current medication (Lisinopril 20 mg (two tablets), Metoprolol XL 25 mg (1/2 tablet) daily, and Lasix/K+ prn edema)  Daily BP check; bring log all clinic visits  Instructed to report to ED for any BP greater than 200/100, dizziness, syncope, CP, or SOB  Keep appt. With PCP for follow up  Patient is agreeable to plan and verbalizes understanding

## 2025-01-16 DIAGNOSIS — E78.2 MIXED HYPERLIPIDEMIA: ICD-10-CM

## 2025-01-16 RX ORDER — ATORVASTATIN CALCIUM 40 MG/1
40 TABLET, FILM COATED ORAL NIGHTLY
Qty: 90 TABLET | Refills: 0 | Status: SHIPPED | OUTPATIENT
Start: 2025-01-16 | End: 2026-01-16

## 2025-01-16 RX ORDER — LEVOTHYROXINE SODIUM 50 UG/1
50 TABLET ORAL
Qty: 90 TABLET | Refills: 0 | Status: SHIPPED | OUTPATIENT
Start: 2025-01-16

## 2025-01-16 RX ORDER — LISINOPRIL 20 MG/1
40 TABLET ORAL DAILY
Qty: 180 TABLET | Refills: 0 | Status: SHIPPED | OUTPATIENT
Start: 2025-01-16 | End: 2025-04-16

## 2025-01-16 NOTE — TELEPHONE ENCOUNTER
----- Message from Tal sent at 1/16/2025  2:36 PM CST -----  .Who Called: Eloy Patel    Refill or New Rx:New Rx    RX Name and Strength: levothyroxine (SYNTHROID) 50 MCG tablet    How is the patient currently taking it? (ex. 1XDay): Sig - Route: TAKE 1 TABLET   BY MOUTH EVERY DAY - Oral    Is this a 30 day or 90 day RX: 90    Local or Mail Order: Local    List of preferred pharmacies on file (remove unneeded): [facility] #[id number] - [place], [place] - [address]   Phone: [phone]  Fax: [phone]    Ordering Provider: Dr. Corrales    Preferred Method of Contact: Phone Call    Patient's Preferred Phone Number on File: [phone]     Best Call Back Number, if different:    Additional Information: N/A

## 2025-02-25 RX ORDER — LEVOTHYROXINE SODIUM 50 UG/1
50 TABLET ORAL
Qty: 90 TABLET | Refills: 0 | Status: SHIPPED | OUTPATIENT
Start: 2025-02-25

## 2025-03-27 ENCOUNTER — OFFICE VISIT (OUTPATIENT)
Dept: FAMILY MEDICINE | Facility: CLINIC | Age: 85
End: 2025-03-27
Payer: MEDICARE

## 2025-03-27 VITALS
SYSTOLIC BLOOD PRESSURE: 124 MMHG | HEART RATE: 67 BPM | RESPIRATION RATE: 16 BRPM | TEMPERATURE: 98 F | WEIGHT: 179.13 LBS | DIASTOLIC BLOOD PRESSURE: 78 MMHG | OXYGEN SATURATION: 97 % | HEIGHT: 69 IN | BODY MASS INDEX: 26.53 KG/M2

## 2025-03-27 DIAGNOSIS — E78.2 MIXED HYPERLIPIDEMIA: ICD-10-CM

## 2025-03-27 DIAGNOSIS — E03.9 ACQUIRED HYPOTHYROIDISM: ICD-10-CM

## 2025-03-27 DIAGNOSIS — I10 PRIMARY HYPERTENSION: Primary | ICD-10-CM

## 2025-03-27 DIAGNOSIS — I25.10 CORONARY ARTERY DISEASE INVOLVING NATIVE CORONARY ARTERY OF NATIVE HEART WITHOUT ANGINA PECTORIS: ICD-10-CM

## 2025-03-27 DIAGNOSIS — Z00.00 WELLNESS EXAMINATION: ICD-10-CM

## 2025-03-27 DIAGNOSIS — R79.9 ABNORMAL FINDING OF BLOOD CHEMISTRY, UNSPECIFIED: ICD-10-CM

## 2025-03-27 DIAGNOSIS — Z74.09 MOBILITY IMPAIRED: ICD-10-CM

## 2025-03-27 DIAGNOSIS — Z87.898 HISTORY OF ELEVATED PSA: ICD-10-CM

## 2025-03-27 DIAGNOSIS — G47.33 OSA ON CPAP: ICD-10-CM

## 2025-03-27 DIAGNOSIS — I65.23 BILATERAL CAROTID ARTERY STENOSIS: ICD-10-CM

## 2025-03-27 PROBLEM — Z01.818 PRE-OP EXAM: Status: RESOLVED | Noted: 2023-01-03 | Resolved: 2025-03-27

## 2025-03-27 PROBLEM — S70.01XA HEMATOMA OF RIGHT HIP: Status: RESOLVED | Noted: 2024-03-25 | Resolved: 2025-03-27

## 2025-03-27 PROBLEM — Z12.5 ENCOUNTER FOR PROSTATE CANCER SCREENING: Status: RESOLVED | Noted: 2022-08-31 | Resolved: 2025-03-27

## 2025-03-27 PROBLEM — Z23 NEED FOR VACCINATION AGAINST STREPTOCOCCUS PNEUMONIAE: Status: RESOLVED | Noted: 2022-08-31 | Resolved: 2025-03-27

## 2025-03-27 RX ORDER — AMLODIPINE BESYLATE 5 MG/1
5 TABLET ORAL
COMMUNITY
Start: 2025-02-25

## 2025-03-27 NOTE — PROGRESS NOTES
Subjective:        Patient ID: Eloy Patel is a 85 y.o. male.    Chief Complaint: Follow-up (6 month follow up HTN/Question about taking ibuprofen vs tylenol )      presents to clinic for chronic condition follow up.  He is due for his wellness visit in september.  He is driving.  Son came with him but waiting in lobby    He was inpatient at [facility] on 8/23/23-8/29/23 for planned pleurodesis with dr. 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 He saw pulmonology while he was there- dr. Cortes- as well.  Has not seen dr. Cortes lately.      psa was elevated. has not been elevated in past. has appt with dr. douglas, urology.  He checks psa.       He has hypertension and is prescribed lisinopril 20mg po bid and hydrochlorothiazide 25mg qam and norvasc 5mg. he has been monitoring his blood pressure at home and reports compliance with meds. Denies any chest pain or headache. was told to stop asa due to blood in urine per urologist. He also has hyperlipidemia and is on TriCor. He has carotid artery disease. He has had aortic valve replacement. He has cad and cabg x3  in 3/2023.  He denies any history of afib or heart failure. His cards is dr. Ann. Lov 10/31/2024.   Has appt with dr. Ann in April. .      he reports he cannot walk without assistive device. 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 He had right hip replacement with dr. Thorne 10/2023.   Ambulates with rolling walker because it can carry stuff. Not able to use cane.   has handicap tag.       He also has hypothyroidism and is on Synthroid     He has sleep apnea and is on a CPAP. he is tolerating his cpap well. Not using as much since his surgery.      Has been seen by Dr. Canseco for his multiple moles in January 2019. Had biopsy done and lesion removed on right neck with Dr. Talamantes at [facility] last year.     He had a colonoscopy with Dr. Rivera 6/24/19 which was good and was told he did not need to have  "another one. he had bilateral inguinal hernia repair surgery with dr. fernandes in 9/2019. he is doing well.     He is allergic to Bactrim. He does not smoke. He drinks alcohol on occasion. Pneumovax 3/2019 thinks had tetanus at [facility].            Review of Systems   Constitutional: Negative.    HENT: Negative.     Eyes: Negative.    Respiratory: Negative.     Cardiovascular: Negative.    Gastrointestinal: Negative.    Endocrine: Negative.    Genitourinary: Negative.    Musculoskeletal: Negative.    Skin: Negative.    Allergic/Immunologic: Negative.    Neurological: Negative.    Hematological: Negative.    Psychiatric/Behavioral: Negative.     All other systems reviewed and are negative.        Review of patient's allergies indicates:   Allergen Reactions    Sulfamethoxazole-trimethoprim      Other reaction(s): vomiting    Sulfa (sulfonamide antibiotics) Rash and Itching      Vitals:    03/27/25 0935   BP: 124/78   BP Location: Left arm   Patient Position: Sitting   Pulse: 67   Resp: 16   Temp: 97.8 °F (36.6 °C)   TempSrc: Oral   SpO2: 97%   Weight: 81.2 kg (179 lb 1.6 oz)   Height: 5' 9" (1.753 m)      Social History     Socioeconomic History    Marital status:    Occupational History    Occupation: retired   Tobacco Use    Smoking status: Former     Types: Cigarettes     Passive exposure: Never    Smokeless tobacco: Never   Substance and Sexual Activity    Alcohol use: Yes     Comment: Occassionally (beer)    Drug use: Never     Social Drivers of Health     Financial Resource Strain: Low Risk  (8/23/2023)    Received from [employer] [facility] and Its Subsidiaries and Affiliates    Overall Financial Resource Strain (CARDIA)     Difficulty of Paying Living Expenses: Not hard at all   Food Insecurity: No Food Insecurity (11/2/2023)    Received from [employer] [facility] [facility] and Its Subsidiaries and Affiliates    Hunger Vital Sign     Worried About Running Out " of Food in the Last Year: Never true     Ran Out of Food in the Last Year: Never true   Transportation Needs: No Transportation Needs (8/23/2023)    [facility]    PRAPARE - Transportation     Lack of Transportation (Medical): No     Lack of Transportation (Non-Medical): No   Housing Stability: Unknown (8/23/2023)    [facility]    Housing Stability Vital Sign     Unable to Pay for Housing in the Last Year: No      No family history on file.       Objective:     Physical Exam  Vitals and nursing note reviewed.   Constitutional:       Appearance: Normal appearance. He is normal weight.   HENT:      Head: Normocephalic.      Nose: Nose normal.      Mouth/Throat:      Mouth: Mucous membranes are moist.      Pharynx: Oropharynx is clear.   Eyes:      Extraocular Movements: Extraocular movements intact.   Cardiovascular:      Rate and Rhythm: Normal rate and regular rhythm.   Pulmonary:      Effort: Pulmonary effort is normal.      Breath sounds: Normal breath sounds.   Musculoskeletal:         General: Normal range of motion.      Comments: Ambulates with roller walker   Skin:     General: Skin is warm and dry.   Neurological:      General: No focal deficit present.      Mental Status: He is alert and oriented to person, place, and time. Mental status is at baseline.   Psychiatric:         Mood and Affect: Mood normal.       Medications Ordered Prior to Encounter[1]  Health Maintenance   Topic Date Due    TETANUS VACCINE  Never done    Aspirin/Antiplatelet Therapy  03/27/2026    Lipid Panel  08/26/2029    Shingles Vaccine  Completed    Influenza Vaccine  Completed    COVID-19 Vaccine  Completed    RSV Vaccine (Age 60+ and Pregnant patients)  Completed    Pneumococcal Vaccines (Age 50+)  Completed      Results for orders placed or performed in  visit on 09/16/24   Comprehensive Metabolic Panel    Collection Time: 09/16/24 10:04 AM   Result Value Ref Range    Sodium 140 136 - 145 mmol/L    Potassium 4.0 3.5 - 5.1 mmol/L    Chloride 107 98 - 107 mmol/L    CO2 26 23 - 31 mmol/L    Glucose 98 82 - 115 mg/dL    Blood Urea Nitrogen 21.5 8.4 - 25.7 mg/dL    Creatinine 0.78 0.73 - 1.18 mg/dL    Calcium 9.6 8.8 - 10.0 mg/dL    Protein Total 6.3 5.8 - 7.6 gm/dL    Albumin 3.7 3.4 - 4.8 g/dL    Globulin 2.6 2.4 - 3.5 gm/dL    Albumin/Globulin Ratio 1.4 1.1 - 2.0 ratio    Bilirubin Total 0.9 <=1.5 mg/dL     (H) 40 - 150 unit/L    ALT 32 0 - 55 unit/L    AST 30 5 - 34 unit/L    eGFR >60 mL/min/1.73/m2    Anion Gap 7.0 mEq/L    BUN/Creatinine Ratio 28    Hemoglobin A1C    Collection Time: 09/16/24 10:04 AM   Result Value Ref Range    Hemoglobin A1c 5.2 <=7.0 %    Estimated Average Glucose 102.5 mg/dL   Urinalysis, Reflex to Urine Culture    Collection Time: 09/16/24 10:04 AM    Specimen: Urine   Result Value Ref Range    Color, UA Yellow Yellow, Light-Yellow, Dark Yellow, Mena, Straw    Appearance, UA Clear Clear    Specific Gravity, UA >=1.030 1.005 - 1.030    pH, UA 6.0 5.0 - 8.5    Protein, UA Trace (A) Negative    Glucose, UA Negative Negative, Normal    Ketones, UA Negative Negative    Blood, UA Small (A) Negative    Bilirubin, UA Negative Negative    Urobilinogen, UA 0.2 0.2, 1.0, Normal    Nitrites, UA Negative Negative    Leukocyte Esterase, UA Negative Negative   CBC with Differential    Collection Time: 09/16/24 10:04 AM   Result Value Ref Range    WBC 5.15 4.50 - 11.50 x10(3)/mcL    RBC 3.96 (L) 4.70 - 6.10 x10(6)/mcL    Hgb 12.7 (L) 14.0 - 18.0 g/dL    Hct 36.5 (L) 42.0 - 52.0 %    MCV 92.2 80.0 - 94.0 fL    MCH 32.1 (H) 27.0 - 31.0 pg    MCHC 34.8 33.0 - 36.0 g/dL    RDW 14.0 11.5 - 17.0 %    Platelet 149 130 - 400 x10(3)/mcL    MPV 9.7 7.4 - 10.4 fL    Neut % 65.2 %    Lymph % 21.6 %    Mono % 8.5 %    Eos % 3.5 %    Basophil % 1.0 %    Lymph #  1.11 0.6 - 4.6 x10(3)/mcL    Neut # 3.36 2.1 - 9.2 x10(3)/mcL    Mono # 0.44 0.1 - 1.3 x10(3)/mcL    Eos # 0.18 0 - 0.9 x10(3)/mcL    Baso # 0.05 <=0.2 x10(3)/mcL    Imm Gran # 0.01 0 - 0.04 x10(3)/mcL    Imm Grans % 0.2 %    NRBC% 0.0 %   Urinalysis, Microscopic    Collection Time: 09/16/24 10:04 AM   Result Value Ref Range    Bacteria, UA Rare None Seen, Rare, Occasional /HPF    RBC, UA 0-2 None Seen, 0-2, 3-5, 0-5 /HPF    WBC, UA None Seen None Seen, 0-2, 3-5, 0-5 /HPF    Squamous Epithelial Cells, UA None Seen None Seen, Rare, Occasional, Occ /HPF          Assessment & Plan:     Active Problem List with Overview Notes    Diagnosis Date Noted    Aortic atherosclerosis 09/27/2024    Wellness examination 09/27/2024    CAD (coronary artery disease)     Carotid artery stenosis     Hypernatremia 09/25/2023    Pleural effusion, bilateral 04/03/2023    Anemia, unspecified     Primary hypertension 08/31/2022    Hyperlipidemia 08/31/2022    WOLF on CPAP 08/31/2022    Hypothyroidism, unspecified 08/31/2022    Right hip pain 08/31/2022    Chronic pain of both knees 08/31/2022    Mobility impaired 08/31/2022    History of elevated PSA 08/31/2022    Murmur, cardiac 08/31/2022       1. Primary hypertension  Assessment & Plan:  Well controlled. On asa.  Continue current prescription medication. Keep appts with cards    Orders:  -     CBC Auto Differential; Future; Expected date: 09/27/2025  -     Comprehensive Metabolic Panel; Future; Expected date: 09/27/2025  -     Lipid Panel; Future; Expected date: 09/27/2025  -     TSH; Future; Expected date: 09/27/2025  -     Hemoglobin A1C; Future; Expected date: 09/27/2025  -     Urinalysis; Future; Expected date: 09/27/2025    2. Mixed hyperlipidemia  Assessment & Plan:  Stable on statin.  Keep appts with cards      Orders:  -     CBC Auto Differential; Future; Expected date: 09/27/2025  -     Comprehensive Metabolic Panel; Future; Expected date: 09/27/2025  -     Lipid Panel; Future;  Expected date: 09/27/2025  -     TSH; Future; Expected date: 09/27/2025  -     Hemoglobin A1C; Future; Expected date: 09/27/2025  -     Urinalysis; Future; Expected date: 09/27/2025    3. Coronary artery disease involving native coronary artery of native heart without angina pectoris  Assessment & Plan:  Stable. S/p cabg x 3 3/2023.  Keep appts with cards.     Orders:  -     CBC Auto Differential; Future; Expected date: 09/27/2025  -     Comprehensive Metabolic Panel; Future; Expected date: 09/27/2025  -     Lipid Panel; Future; Expected date: 09/27/2025  -     TSH; Future; Expected date: 09/27/2025  -     Hemoglobin A1C; Future; Expected date: 09/27/2025  -     Urinalysis; Future; Expected date: 09/27/2025    4. Bilateral carotid artery stenosis  Assessment & Plan:  Stable. Keep appts with cards    Orders:  -     CBC Auto Differential; Future; Expected date: 09/27/2025  -     Comprehensive Metabolic Panel; Future; Expected date: 09/27/2025  -     Lipid Panel; Future; Expected date: 09/27/2025  -     TSH; Future; Expected date: 09/27/2025  -     Hemoglobin A1C; Future; Expected date: 09/27/2025  -     Urinalysis; Future; Expected date: 09/27/2025    5. Acquired hypothyroidism  Assessment & Plan:  Lab Results   Component Value Date    TSH 2.867 09/22/2023     Stable on current prescription med    Orders:  -     CBC Auto Differential; Future; Expected date: 09/27/2025  -     Comprehensive Metabolic Panel; Future; Expected date: 09/27/2025  -     Lipid Panel; Future; Expected date: 09/27/2025  -     TSH; Future; Expected date: 09/27/2025  -     Hemoglobin A1C; Future; Expected date: 09/27/2025  -     Urinalysis; Future; Expected date: 09/27/2025    6. WOLF on CPAP  Assessment & Plan:  Reports compliance and benefits from continued use      Orders:  -     CBC Auto Differential; Future; Expected date: 09/27/2025  -     Comprehensive Metabolic Panel; Future; Expected date: 09/27/2025  -     Lipid Panel; Future; Expected  date: 09/27/2025  -     TSH; Future; Expected date: 09/27/2025  -     Hemoglobin A1C; Future; Expected date: 09/27/2025  -     Urinalysis; Future; Expected date: 09/27/2025    7. Mobility impaired  Assessment & Plan:  Uses roller walker. Fall precautions discussed    Orders:  -     CBC Auto Differential; Future; Expected date: 09/27/2025  -     Comprehensive Metabolic Panel; Future; Expected date: 09/27/2025  -     Lipid Panel; Future; Expected date: 09/27/2025  -     TSH; Future; Expected date: 09/27/2025  -     Hemoglobin A1C; Future; Expected date: 09/27/2025  -     Urinalysis; Future; Expected date: 09/27/2025    8. History of elevated PSA  Assessment & Plan:  Keep appts with dr. Daniels. He follows psa.  On flomax      9. Wellness examination  -     CBC Auto Differential; Future; Expected date: 09/27/2025  -     Comprehensive Metabolic Panel; Future; Expected date: 09/27/2025  -     Lipid Panel; Future; Expected date: 09/27/2025  -     TSH; Future; Expected date: 09/27/2025  -     Hemoglobin A1C; Future; Expected date: 09/27/2025  -     Urinalysis; Future; Expected date: 09/27/2025    10. Abnormal finding of blood chemistry, unspecified  -     Hemoglobin A1C; Future; Expected date: 09/27/2025         Follow up in about 6 months (around 9/27/2025) for Medicare Wellness with labs.          [1]   Current Outpatient Medications on File Prior to Visit   Medication Sig Dispense Refill    ADULT LOW DOSE ASPIRIN 81 mg EC tablet Take 81 mg by mouth 2 (two) times a day.      amLODIPine (NORVASC) 5 MG tablet Take 5 mg by mouth.      atorvastatin (LIPITOR) 40 MG tablet Take 1 tablet (40 mg total) by mouth every evening. 90 tablet 0    docusate sodium (COLACE) 100 MG capsule Take 1 capsule by mouth every evening.      levothyroxine (SYNTHROID) 50 MCG tablet TAKE 1 TABLET BY MOUTH BEFORE BREAKFAST. 90 tablet 0    lisinopriL (PRINIVIL,ZESTRIL) 20 MG tablet Take 2 tablets (40 mg total) by mouth once daily. 180 tablet 0     metoprolol succinate (TOPROL-XL) 25 MG 24 hr tablet Take 0.5 tablets (12.5 mg total) by mouth once daily. 45 tablet 3    multivit-min-FA-lycopen-lutein 300-600-300 mcg Tab Take 1 tablet by mouth once daily.      tamsulosin (FLOMAX) 0.4 mg Cap Take 0.4 mg by mouth once daily at 6am.      TYLENOL 325 mg tablet Take by mouth as needed.       No current facility-administered medications on file prior to visit.

## 2025-03-28 PROBLEM — Z86.79 HISTORY OF ATRIAL FIBRILLATION: Status: RESOLVED | Noted: 2023-09-25 | Resolved: 2025-03-28

## 2025-03-28 PROBLEM — I48.91 ATRIAL FIBRILLATION, UNSPECIFIED TYPE: Status: RESOLVED | Noted: 2024-09-27 | Resolved: 2025-03-28

## 2025-03-28 PROBLEM — I11.0 HYPERTENSIVE HEART DISEASE WITH HEART FAILURE: Status: RESOLVED | Noted: 2024-09-27 | Resolved: 2025-03-28

## 2025-04-09 DIAGNOSIS — E78.2 MIXED HYPERLIPIDEMIA: ICD-10-CM

## 2025-04-09 RX ORDER — ATORVASTATIN CALCIUM 40 MG/1
40 TABLET, FILM COATED ORAL NIGHTLY
Qty: 90 TABLET | Refills: 0 | Status: SHIPPED | OUTPATIENT
Start: 2025-04-09

## 2025-05-07 DIAGNOSIS — E78.2 MIXED HYPERLIPIDEMIA: ICD-10-CM

## 2025-05-07 RX ORDER — ATORVASTATIN CALCIUM 40 MG/1
40 TABLET, FILM COATED ORAL NIGHTLY
Qty: 90 TABLET | Refills: 0 | Status: SHIPPED | OUTPATIENT
Start: 2025-05-07

## 2025-06-03 DIAGNOSIS — I10 PRIMARY HYPERTENSION: Primary | ICD-10-CM

## 2025-06-03 RX ORDER — LISINOPRIL 20 MG/1
40 TABLET ORAL DAILY
Qty: 180 TABLET | Refills: 0 | Status: SHIPPED | OUTPATIENT
Start: 2025-06-03 | End: 2025-09-01

## 2025-06-12 DIAGNOSIS — I10 PRIMARY HYPERTENSION: ICD-10-CM

## 2025-06-12 RX ORDER — METOPROLOL SUCCINATE 25 MG/1
12.5 TABLET, EXTENDED RELEASE ORAL
Qty: 45 TABLET | Refills: 3 | Status: SHIPPED | OUTPATIENT
Start: 2025-06-12

## 2025-07-16 DIAGNOSIS — I10 PRIMARY HYPERTENSION: ICD-10-CM

## 2025-07-17 ENCOUNTER — TELEPHONE (OUTPATIENT)
Dept: FAMILY MEDICINE | Facility: CLINIC | Age: 85
End: 2025-07-17
Payer: MEDICARE

## 2025-07-17 RX ORDER — LISINOPRIL 20 MG/1
40 TABLET ORAL
Qty: 180 TABLET | Refills: 0 | Status: SHIPPED | OUTPATIENT
Start: 2025-07-17

## 2025-07-17 NOTE — TELEPHONE ENCOUNTER
Copied from CRM #[id number]. Topic: Medications - Medication Refill  >> Jul 17, 2025 12:31 PM Jessie wrote:  .Who Called: Eloy Paetl    Refill or New Rx:Refill  RX Name and Strength:levothyroxine (SYNTHROID) 50 MCG tablet  How is the patient currently taking it? (ex. 1XDay):1x   Is this a 30 day or 90 day RX:90   Local or Mail Order:local   List of preferred pharmacies on file (remove unneeded): [facility]/PHARMACY #[id number] - [place], [place]  If different Pharmacy is requested, enter Pharmacy information here including location and phone number:    Ordering Provider:Savanna       Preferred Method of Contact: Phone Call  Patient's Preferred Phone Number on File: [phone]   Best Call Back Number, if different:  Additional Information: Pt stated pharmacy stated they cannot fill until  July 22nd- but pt is currently out and requesting office to call pharmacy to get filled sooner thanks

## 2025-07-18 ENCOUNTER — TELEPHONE (OUTPATIENT)
Dept: FAMILY MEDICINE | Facility: CLINIC | Age: 85
End: 2025-07-18
Payer: MEDICARE

## 2025-07-18 DIAGNOSIS — E03.9 ACQUIRED HYPOTHYROIDISM: Primary | ICD-10-CM

## 2025-07-18 RX ORDER — LEVOTHYROXINE SODIUM 50 UG/1
50 TABLET ORAL
Qty: 90 TABLET | Refills: 0 | Status: SHIPPED | OUTPATIENT
Start: 2025-07-18

## 2025-07-18 NOTE — TELEPHONE ENCOUNTER
Copied from CRM #[id number]. Topic: Medications - Medication Refill  >> Jul 18, 2025 11:40 AM Victoriano wrote:  Who Called: Eloy Patel    Refill or New Rx:Refill  RX Name and Strength:  levothyroxine (SYNTHROID) 50 MCG tablet  How is the patient currently taking it? (ex. 1XDay):   Is this a 30 day or 90 day RX:   Local or Mail Order: [facility]/pharmacy #[id number]  [place], [place] - [address]      List of preferred pharmacies on file (remove unneeded): [unfilled]  If different Pharmacy is requested, enter Pharmacy information here including location and phone number:     Ordering Provider:       Preferred Method of Contact: Phone Call  Patient's Preferred Phone Number on File: [phone]   Best Call Back Number, if different:  Additional Information:  refill

## 2025-08-28 DIAGNOSIS — E03.9 ACQUIRED HYPOTHYROIDISM: ICD-10-CM

## 2025-08-29 RX ORDER — LEVOTHYROXINE SODIUM 50 UG/1
50 TABLET ORAL
Qty: 90 TABLET | Refills: 0 | Status: SHIPPED | OUTPATIENT
Start: 2025-08-29